# Patient Record
Sex: FEMALE | Race: WHITE | Employment: UNEMPLOYED | URBAN - METROPOLITAN AREA
[De-identification: names, ages, dates, MRNs, and addresses within clinical notes are randomized per-mention and may not be internally consistent; named-entity substitution may affect disease eponyms.]

---

## 2018-04-28 ENCOUNTER — HOSPITAL ENCOUNTER (OUTPATIENT)
Dept: GENERAL RADIOLOGY | Age: 83
Discharge: HOME | End: 2018-04-30
Payer: MEDICARE

## 2018-04-28 ENCOUNTER — HOSPITAL ENCOUNTER (OUTPATIENT)
Age: 83
Discharge: HOME | End: 2018-04-30
Payer: MEDICARE

## 2018-04-28 DIAGNOSIS — R52 PAIN: ICD-10-CM

## 2018-04-28 PROCEDURE — 73630 X-RAY EXAM OF FOOT: CPT

## 2018-06-14 ENCOUNTER — TELEPHONE (OUTPATIENT)
Dept: NEUROLOGY | Age: 83
End: 2018-06-14

## 2018-08-23 ENCOUNTER — TELEPHONE (OUTPATIENT)
Dept: ADMINISTRATIVE | Age: 83
End: 2018-08-23

## 2018-08-23 NOTE — TELEPHONE ENCOUNTER
8/23/2018  Spoke with patient and offered 9/4/18 but she has to take her son to the cancer doctor and he is blind.   9/18/18 @9:30am      SAE

## 2018-08-23 NOTE — TELEPHONE ENCOUNTER
New patient appt schd with Dr Boby Smith on 9/18/18 for syncope.     Referral complete, cardiac history scanned

## 2018-09-18 ENCOUNTER — OFFICE VISIT (OUTPATIENT)
Dept: CARDIOLOGY CLINIC | Age: 83
End: 2018-09-18
Payer: MEDICARE

## 2018-09-18 VITALS
HEIGHT: 63 IN | BODY MASS INDEX: 36.32 KG/M2 | HEART RATE: 70 BPM | DIASTOLIC BLOOD PRESSURE: 64 MMHG | SYSTOLIC BLOOD PRESSURE: 118 MMHG | RESPIRATION RATE: 12 BRPM | WEIGHT: 205 LBS

## 2018-09-18 DIAGNOSIS — R55 SYNCOPE, UNSPECIFIED SYNCOPE TYPE: Primary | ICD-10-CM

## 2018-09-18 DIAGNOSIS — I10 ESSENTIAL HYPERTENSION: ICD-10-CM

## 2018-09-18 DIAGNOSIS — E66.01 MORBID OBESITY (HCC): ICD-10-CM

## 2018-09-18 DIAGNOSIS — I44.7 LEFT BUNDLE BRANCH BLOCK: ICD-10-CM

## 2018-09-18 PROCEDURE — 93000 ELECTROCARDIOGRAM COMPLETE: CPT | Performed by: INTERNAL MEDICINE

## 2018-09-18 PROCEDURE — 1090F PRES/ABSN URINE INCON ASSESS: CPT | Performed by: INTERNAL MEDICINE

## 2018-09-18 PROCEDURE — G8417 CALC BMI ABV UP PARAM F/U: HCPCS | Performed by: INTERNAL MEDICINE

## 2018-09-18 PROCEDURE — 1101F PT FALLS ASSESS-DOCD LE1/YR: CPT | Performed by: INTERNAL MEDICINE

## 2018-09-18 PROCEDURE — 99204 OFFICE O/P NEW MOD 45 MIN: CPT | Performed by: INTERNAL MEDICINE

## 2018-09-18 PROCEDURE — 1123F ACP DISCUSS/DSCN MKR DOCD: CPT | Performed by: INTERNAL MEDICINE

## 2018-09-18 PROCEDURE — G8400 PT W/DXA NO RESULTS DOC: HCPCS | Performed by: INTERNAL MEDICINE

## 2018-09-18 PROCEDURE — G8427 DOCREV CUR MEDS BY ELIG CLIN: HCPCS | Performed by: INTERNAL MEDICINE

## 2018-09-18 PROCEDURE — 4040F PNEUMOC VAC/ADMIN/RCVD: CPT | Performed by: INTERNAL MEDICINE

## 2018-09-18 PROCEDURE — 1036F TOBACCO NON-USER: CPT | Performed by: INTERNAL MEDICINE

## 2018-09-18 NOTE — PATIENT INSTRUCTIONS
the  may tell you to chew 1 adult-strength or 2 to 4 low-dose aspirin. Wait for an ambulance. Do not try to drive yourself.   Watch closely for changes in your health, and be sure to contact your doctor if you have any problems. Where can you learn more? Go to [email or web address] and sign in to your [employer] account. Enter Z751 in the [employer] box to learn more about \"A Healthy Heart: Care Instructions. \"     If you do not have an account, please click on the \"Sign Up Now\" link. Current as of: December 6, 2017  Content Version: 11.7  © 2379-1185 "[employer]. Care instructions adapted under license by [facility] ([facility]). If you have questions about a medical condition or this instruction, always ask your healthcare professional. [address] any warranty or liability for your use of this information.

## 2018-09-18 NOTE — PROGRESS NOTES
08/18/2014     Lab Results   Component Value Date    PROTIME 10.0 01/20/2015    INR 1.0 01/20/2015     Lab Results   Component Value Date    TSH 3.800 07/02/2015     No components found for: CHLPL  Lab Results   Component Value Date    TRIG 319 (H) 07/02/2015    TRIG 162 (H) 06/14/2014    TRIG 136 01/04/2014     Lab Results   Component Value Date    HDL 51 07/02/2015    HDL 52 06/14/2014    HDL 58 01/04/2014     Lab Results   Component Value Date    LDLCALC 134 (H) 07/02/2015    LDLCALC 122 (H) 06/14/2014    LDLCALC 139 (H) 01/04/2014     Laboratory studies of August, 2018 from her primary care physician included a hemoglobin of 9.5 g/dL with normal renal function electrolytes and BUN and creatinine of 16 and 0.9 mg/dL respectively with no evidence of electrolyte abnormalities and an LDL cholesterol level of 110 mg/dL. Cardiac Tests:  ECG: A resting electrocardiogram demonstrates evidence of sinus rhythm with left axis deviation and a left bundle branch block conduction pattern  Last stress test:  A gated vasodilator myocardial perfusion imaging study of March, 2016 demonstrated no evidence of perfusion abdomen is with normal left ventricular systolic function      ASSESSMENT / PLAN:  On a clinical basis, the patient presents with extremely vague symptoms involving her head and not clearly indicative of a cardiovascular origin. In addition, she has a history of recurrent syncope with her description classic for that of a vasodepressor mediated event in the face of her chronically noted left bundle branch block conduction pattern. At present, I have recommended an echocardiogram to further evaluate the presence or absence of additional structural heart disease and based on the findings may additionally recommend arrhythmia monitoring in light of her chronic conduction abnormalities to assure that this is not a further contributing factor.  She will benefit from appropriate lifestyle modification to achieve weight reduction which will benefit diastolic cardiac performance as well as assist in aggressive risk factor modification of blood pressure and serum lipids and attempt to reduce risk of future atherosclerotic development. I will await the findings of her echocardiogram to provide additional recommendations including that of the needs of additional cardiovascular follow-up and would happily reassess her in the future should additional cardiovascular difficulties or concerns arise. Follow-up office visit based on echocardiographic findings    Thank you for allowing me to participate in your patient's care. Please feel free to contact me if you have any questions or concerns. Reuben Nyhan.  [facility], Count includes the [facility]    An electronic copy of this consult note was forwarded to Dr. Edwardo Holguin

## 2018-10-08 ENCOUNTER — HOSPITAL ENCOUNTER (OUTPATIENT)
Dept: CARDIOLOGY | Age: 83
Discharge: HOME | End: 2018-10-08
Payer: MEDICARE

## 2018-10-08 ENCOUNTER — TELEPHONE (OUTPATIENT)
Dept: CARDIOLOGY CLINIC | Age: 83
End: 2018-10-08

## 2018-10-08 DIAGNOSIS — R55 SYNCOPE, UNSPECIFIED SYNCOPE TYPE: ICD-10-CM

## 2018-10-08 DIAGNOSIS — I44.7 LEFT BUNDLE BRANCH BLOCK: ICD-10-CM

## 2018-10-08 DIAGNOSIS — I10 ESSENTIAL HYPERTENSION: ICD-10-CM

## 2018-10-08 LAB
LV EF: 70 %
LVEF MODALITY: NORMAL

## 2018-10-08 PROCEDURE — 93306 TTE W/DOPPLER COMPLETE: CPT

## 2018-12-06 ENCOUNTER — TELEPHONE (OUTPATIENT)
Dept: CARDIOLOGY CLINIC | Age: 83
End: 2018-12-06

## 2019-02-25 ENCOUNTER — OFFICE VISIT (OUTPATIENT)
Dept: NEUROLOGY | Age: 84
End: 2019-02-25
Payer: MEDICARE

## 2019-02-25 VITALS
DIASTOLIC BLOOD PRESSURE: 100 MMHG | RESPIRATION RATE: 12 BRPM | HEIGHT: 63 IN | WEIGHT: 197 LBS | OXYGEN SATURATION: 99 % | BODY MASS INDEX: 34.91 KG/M2 | HEART RATE: 66 BPM | SYSTOLIC BLOOD PRESSURE: 170 MMHG

## 2019-02-25 DIAGNOSIS — R42 DIZZINESS AND GIDDINESS: ICD-10-CM

## 2019-02-25 DIAGNOSIS — E53.8 VITAMIN B12 DEFICIENCY: Chronic | ICD-10-CM

## 2019-02-25 DIAGNOSIS — R55 SYNCOPE, UNSPECIFIED SYNCOPE TYPE: Primary | ICD-10-CM

## 2019-02-25 DIAGNOSIS — E61.1 IRON DEFICIENCY: ICD-10-CM

## 2019-02-25 DIAGNOSIS — R55 RECURRENT SYNCOPE: ICD-10-CM

## 2019-02-25 PROCEDURE — G8417 CALC BMI ABV UP PARAM F/U: HCPCS | Performed by: PSYCHIATRY & NEUROLOGY

## 2019-02-25 PROCEDURE — 1036F TOBACCO NON-USER: CPT | Performed by: PSYCHIATRY & NEUROLOGY

## 2019-02-25 PROCEDURE — 1101F PT FALLS ASSESS-DOCD LE1/YR: CPT | Performed by: PSYCHIATRY & NEUROLOGY

## 2019-02-25 PROCEDURE — G8400 PT W/DXA NO RESULTS DOC: HCPCS | Performed by: PSYCHIATRY & NEUROLOGY

## 2019-02-25 PROCEDURE — 4040F PNEUMOC VAC/ADMIN/RCVD: CPT | Performed by: PSYCHIATRY & NEUROLOGY

## 2019-02-25 PROCEDURE — G8484 FLU IMMUNIZE NO ADMIN: HCPCS | Performed by: PSYCHIATRY & NEUROLOGY

## 2019-02-25 PROCEDURE — 99215 OFFICE O/P EST HI 40 MIN: CPT | Performed by: PSYCHIATRY & NEUROLOGY

## 2019-02-25 PROCEDURE — 1123F ACP DISCUSS/DSCN MKR DOCD: CPT | Performed by: PSYCHIATRY & NEUROLOGY

## 2019-02-25 PROCEDURE — 1090F PRES/ABSN URINE INCON ASSESS: CPT | Performed by: PSYCHIATRY & NEUROLOGY

## 2019-02-25 PROCEDURE — G8427 DOCREV CUR MEDS BY ELIG CLIN: HCPCS | Performed by: PSYCHIATRY & NEUROLOGY

## 2019-02-25 ASSESSMENT — ENCOUNTER SYMPTOMS
ALLERGIC/IMMUNOLOGIC NEGATIVE: 1
EYES NEGATIVE: 1
GASTROINTESTINAL NEGATIVE: 1
RESPIRATORY NEGATIVE: 1

## 2019-02-26 ENCOUNTER — TELEPHONE (OUTPATIENT)
Dept: NEUROLOGY | Age: 84
End: 2019-02-26

## 2019-03-09 ENCOUNTER — HOSPITAL ENCOUNTER (OUTPATIENT)
Dept: MRI IMAGING | Age: 84
Discharge: HOME | End: 2019-03-11
Payer: MEDICARE

## 2019-03-09 ENCOUNTER — HOSPITAL ENCOUNTER (OUTPATIENT)
Dept: ULTRASOUND IMAGING | Age: 84
Discharge: HOME | End: 2019-03-11
Payer: MEDICARE

## 2019-03-09 ENCOUNTER — HOSPITAL ENCOUNTER (OUTPATIENT)
Age: 84
Discharge: HOME | End: 2019-03-09
Payer: MEDICARE

## 2019-03-09 DIAGNOSIS — R42 DIZZINESS AND GIDDINESS: ICD-10-CM

## 2019-03-09 DIAGNOSIS — R55 RECURRENT SYNCOPE: ICD-10-CM

## 2019-03-09 DIAGNOSIS — R55 SYNCOPE, UNSPECIFIED SYNCOPE TYPE: ICD-10-CM

## 2019-03-09 PROCEDURE — 70551 MRI BRAIN STEM W/O DYE: CPT

## 2019-03-09 PROCEDURE — 93880 EXTRACRANIAL BILAT STUDY: CPT

## 2019-03-09 PROCEDURE — 80183 DRUG SCRN QUANT OXCARBAZEPIN: CPT

## 2019-03-11 ENCOUNTER — TELEPHONE (OUTPATIENT)
Dept: NEUROLOGY | Age: 84
End: 2019-03-11

## 2019-03-12 LAB — OXCARBAZEPINE: 9 UG/ML (ref 3–35)

## 2019-03-19 ENCOUNTER — HOSPITAL ENCOUNTER (OUTPATIENT)
Dept: NEUROLOGY | Age: 84
Discharge: HOME | End: 2019-03-19
Payer: MEDICARE

## 2019-03-19 PROCEDURE — 95812 EEG 41-60 MINUTES: CPT | Performed by: PSYCHIATRY & NEUROLOGY

## 2019-03-19 PROCEDURE — 95819 EEG AWAKE AND ASLEEP: CPT

## 2019-03-20 ENCOUNTER — TELEPHONE (OUTPATIENT)
Dept: NEUROLOGY | Age: 84
End: 2019-03-20

## 2019-05-01 ENCOUNTER — HOSPITAL ENCOUNTER (EMERGENCY)
Age: 84
Discharge: HOME | End: 2019-05-01
Attending: EMERGENCY MEDICINE
Payer: MEDICARE

## 2019-05-01 VITALS
RESPIRATION RATE: 16 BRPM | HEART RATE: 68 BPM | SYSTOLIC BLOOD PRESSURE: 136 MMHG | BODY MASS INDEX: 34.73 KG/M2 | OXYGEN SATURATION: 98 % | WEIGHT: 196 LBS | HEIGHT: 63 IN | DIASTOLIC BLOOD PRESSURE: 55 MMHG | TEMPERATURE: 97.8 F

## 2019-05-01 DIAGNOSIS — R55 SYNCOPE AND COLLAPSE: Primary | ICD-10-CM

## 2019-05-01 LAB
CHP ED QC CHECK: NORMAL
GLUCOSE BLD-MCNC: 100 MG/DL
METER GLUCOSE: 100 MG/DL (ref 74–99)

## 2019-05-01 PROCEDURE — 82962 GLUCOSE BLOOD TEST: CPT

## 2019-05-01 PROCEDURE — 99284 EMERGENCY DEPT VISIT MOD MDM: CPT

## 2019-05-01 PROCEDURE — 93005 ELECTROCARDIOGRAM TRACING: CPT | Performed by: STUDENT IN AN ORGANIZED HEALTH CARE EDUCATION/TRAINING PROGRAM

## 2019-05-01 ASSESSMENT — ENCOUNTER SYMPTOMS
DIFFICULTY BREATHING: 0
BACK PAIN: 0
NAUSEA: 0
SHORTNESS OF BREATH: 0
VOMITING: 0

## 2019-05-01 NOTE — ED NOTES
Discharge instructions given, medications and follow up instructions reviewed. Patient verbalized understanding, no other noted or stated problems at this time. Patient will follow up with physicians as directed.       Ramón Woo RN  05/01/19 0617

## 2019-05-01 NOTE — ED NOTES
Bed: 06  Expected date:   Expected time:   Means of arrival:   Comments:  EMS     Scott Brown RN  05/01/19 5766

## 2019-05-01 NOTE — ED PROVIDER NOTES
palpitations. Gastrointestinal: Negative for nausea and vomiting. Genitourinary: Negative for difficulty urinating and dysuria. Musculoskeletal: Negative for back pain and gait problem. Skin: Negative for pallor and rash. Neurological: Negative for dizziness, seizures, weakness and headaches. Psychiatric/Behavioral: Negative for confusion. Physical Exam   Constitutional: She is oriented to person, place, and time. She appears well-developed and well-nourished. No distress. HENT:   Head: Normocephalic and atraumatic. Right Ear: External ear normal.   Left Ear: External ear normal.   Mouth/Throat: Oropharynx is clear and moist.   Eyes: Pupils are equal, round, and reactive to light. Conjunctivae and EOM are normal. Right eye exhibits no discharge. Left eye exhibits no discharge. No scleral icterus. Neck: Normal range of motion. Neck supple. No JVD present. Cardiovascular: Normal rate and regular rhythm. No murmur heard. Pulmonary/Chest: Effort normal and breath sounds normal.   Abdominal: Soft. Bowel sounds are normal. She exhibits no distension. There is no tenderness. Musculoskeletal: She exhibits no edema, tenderness or deformity. Neurological: She is alert and oriented to person, place, and time. No cranial nerve deficit or sensory deficit. She exhibits normal muscle tone. Coordination normal.   Skin: Skin is warm and dry. Capillary refill takes less than 2 seconds. No rash noted. She is not diaphoretic. No pallor. Psychiatric: She has a normal mood and affect. Her behavior is normal.   Nursing note and vitals reviewed. Procedures    MDM  Number of Diagnoses or Management Options  Syncope and collapse:   Diagnosis management comments: Khoa Briggs presented following syncopal episode. This episode is unchanged from previous episodes. Patient did not have any injuries. Patient stable in ED. Patient able to ambulate. .  EKG showed PVC but no significant acute changes from previous. Patient has had recent complete evaluation for episodes. Patient is anxious to leave and states she feels well. Patient's vitals stable. Patient will follow up with PCP by calling office tomorrow. EKG: This EKG is signed and interpreted by me. Rate: 67  Rhythm: Sinus  Interpretation: no acute changes  Comparison: changes compared to previous EKG QTc increased slightly, PVC now present              --------------------------------------------- PAST HISTORY ---------------------------------------------  Past Medical History:  has a past medical history of Allergic rhinitis, Chronic back pain, Dizziness and giddiness, Hypertension, Hypothyroidism, Iron deficiency, Neuropathy, Seizures ([facility] [address] and Vitamin B12 deficiency. Past Surgical History:  has a past surgical history that includes Hysterectomy; Appendectomy; Cholecystectomy; hernia repair (2002/2003); back surgery (MORE 10 YRS ); Breast surgery; Facial nerve surgery (2008); and Colonoscopy (11/1/2011). Social History:  reports that she has never smoked. She has never used smokeless tobacco. She reports that she does not drink alcohol or use drugs. Family History: family history includes Arthritis in her father and mother; Cancer in her brother, father, and sister; Diabetes in her brother, mother, and sister; Heart Disease in her brother, mother, and sister. The patients home medications have been reviewed. Allergies: Aspirin-acetaminophen-caffeine and Pcn [penicillins]    -------------------------------------------------- RESULTS -------------------------------------------------  Labs:  Results for orders placed or performed during the hospital encounter of 05/01/19   POCT Glucose   Result Value Ref Range    Glucose 100 mg/dL    QC OK?      POCT Glucose   Result Value Ref Range    Meter Glucose 100 (H) 74 - 99 mg/dL       Radiology:  No orders to display       ------------------------- NURSING NOTES AND VITALS REVIEWED

## 2019-05-02 LAB
EKG ATRIAL RATE: 67 BPM
EKG P AXIS: 28 DEGREES
EKG P-R INTERVAL: 188 MS
EKG Q-T INTERVAL: 474 MS
EKG QRS DURATION: 136 MS
EKG QTC CALCULATION (BAZETT): 500 MS
EKG R AXIS: -25 DEGREES
EKG T AXIS: 77 DEGREES
EKG VENTRICULAR RATE: 67 BPM

## 2019-05-02 PROCEDURE — 93010 ELECTROCARDIOGRAM REPORT: CPT | Performed by: INTERNAL MEDICINE

## 2020-07-21 ENCOUNTER — APPOINTMENT (OUTPATIENT)
Dept: GENERAL RADIOLOGY | Age: 85
End: 2020-07-21
Payer: MEDICARE

## 2020-07-21 ENCOUNTER — HOSPITAL ENCOUNTER (EMERGENCY)
Age: 85
Discharge: HOME | End: 2020-07-21
Attending: EMERGENCY MEDICINE
Payer: MEDICARE

## 2020-07-21 ENCOUNTER — APPOINTMENT (OUTPATIENT)
Dept: CT IMAGING | Age: 85
End: 2020-07-21
Payer: MEDICARE

## 2020-07-21 VITALS
RESPIRATION RATE: 18 BRPM | OXYGEN SATURATION: 95 % | BODY MASS INDEX: 34.72 KG/M2 | WEIGHT: 196 LBS | HEART RATE: 73 BPM | SYSTOLIC BLOOD PRESSURE: 151 MMHG | DIASTOLIC BLOOD PRESSURE: 86 MMHG | TEMPERATURE: 97.3 F

## 2020-07-21 LAB
ALBUMIN SERPL-MCNC: 4.2 G/DL (ref 3.5–5.2)
ALP BLD-CCNC: 83 U/L (ref 35–104)
ALT SERPL-CCNC: 7 U/L (ref 0–32)
ANION GAP SERPL CALCULATED.3IONS-SCNC: 14 MMOL/L (ref 7–16)
AST SERPL-CCNC: 22 U/L (ref 0–31)
BASOPHILS ABSOLUTE: 0.03 E9/L (ref 0–0.2)
BASOPHILS RELATIVE PERCENT: 0.6 % (ref 0–2)
BILIRUB SERPL-MCNC: 0.7 MG/DL (ref 0–1.2)
BUN BLDV-MCNC: 14 MG/DL (ref 8–23)
CALCIUM SERPL-MCNC: 9.6 MG/DL (ref 8.6–10.2)
CHLORIDE BLD-SCNC: 103 MMOL/L (ref 98–107)
CO2: 23 MMOL/L (ref 22–29)
CREAT SERPL-MCNC: 0.7 MG/DL (ref 0.5–1)
EKG ATRIAL RATE: 69 BPM
EKG P AXIS: 32 DEGREES
EKG P-R INTERVAL: 166 MS
EKG Q-T INTERVAL: 456 MS
EKG QRS DURATION: 132 MS
EKG QTC CALCULATION (BAZETT): 488 MS
EKG R AXIS: -33 DEGREES
EKG T AXIS: 95 DEGREES
EKG VENTRICULAR RATE: 69 BPM
EOSINOPHILS ABSOLUTE: 0.09 E9/L (ref 0.05–0.5)
EOSINOPHILS RELATIVE PERCENT: 1.9 % (ref 0–6)
GFR AFRICAN AMERICAN: >60
GFR NON-AFRICAN AMERICAN: >60 ML/MIN/1.73
GLUCOSE BLD-MCNC: 113 MG/DL (ref 74–99)
HCT VFR BLD CALC: 42.6 % (ref 34–48)
HEMOGLOBIN: 13.8 G/DL (ref 11.5–15.5)
IMMATURE GRANULOCYTES #: 0.01 E9/L
IMMATURE GRANULOCYTES %: 0.2 % (ref 0–5)
LYMPHOCYTES ABSOLUTE: 1.29 E9/L (ref 1.5–4)
LYMPHOCYTES RELATIVE PERCENT: 26.7 % (ref 20–42)
MCH RBC QN AUTO: 28.4 PG (ref 26–35)
MCHC RBC AUTO-ENTMCNC: 32.4 % (ref 32–34.5)
MCV RBC AUTO: 87.7 FL (ref 80–99.9)
MONOCYTES ABSOLUTE: 0.32 E9/L (ref 0.1–0.95)
MONOCYTES RELATIVE PERCENT: 6.6 % (ref 2–12)
NEUTROPHILS ABSOLUTE: 3.09 E9/L (ref 1.8–7.3)
NEUTROPHILS RELATIVE PERCENT: 64 % (ref 43–80)
PDW BLD-RTO: 14 FL (ref 11.5–15)
PLATELET # BLD: 233 E9/L (ref 130–450)
PMV BLD AUTO: 10.4 FL (ref 7–12)
POTASSIUM SERPL-SCNC: 4.3 MMOL/L (ref 3.5–5)
RBC # BLD: 4.86 E12/L (ref 3.5–5.5)
SODIUM BLD-SCNC: 140 MMOL/L (ref 132–146)
TOTAL PROTEIN: 7 G/DL (ref 6.4–8.3)
TROPONIN: <0.01 NG/ML (ref 0–0.03)
WBC # BLD: 4.8 E9/L (ref 4.5–11.5)

## 2020-07-21 PROCEDURE — 96374 THER/PROPH/DIAG INJ IV PUSH: CPT

## 2020-07-21 PROCEDURE — 70486 CT MAXILLOFACIAL W/O DYE: CPT

## 2020-07-21 PROCEDURE — 93010 ELECTROCARDIOGRAM REPORT: CPT | Performed by: INTERNAL MEDICINE

## 2020-07-21 PROCEDURE — 99284 EMERGENCY DEPT VISIT MOD MDM: CPT

## 2020-07-21 PROCEDURE — 85025 COMPLETE CBC W/AUTO DIFF WBC: CPT

## 2020-07-21 PROCEDURE — 71045 X-RAY EXAM CHEST 1 VIEW: CPT

## 2020-07-21 PROCEDURE — 93005 ELECTROCARDIOGRAM TRACING: CPT | Performed by: EMERGENCY MEDICINE

## 2020-07-21 PROCEDURE — 80053 COMPREHEN METABOLIC PANEL: CPT

## 2020-07-21 PROCEDURE — 84484 ASSAY OF TROPONIN QUANT: CPT

## 2020-07-21 PROCEDURE — 6360000002 HC RX W HCPCS: Performed by: EMERGENCY MEDICINE

## 2020-07-21 PROCEDURE — 96375 TX/PRO/DX INJ NEW DRUG ADDON: CPT

## 2020-07-21 RX ORDER — HYDROCODONE BITARTRATE AND ACETAMINOPHEN 5; 325 MG/1; MG/1
1 TABLET ORAL EVERY 6 HOURS PRN
Qty: 12 TABLET | Refills: 0 | Status: SHIPPED | OUTPATIENT
Start: 2020-07-21 | End: 2020-07-24

## 2020-07-21 RX ORDER — FENTANYL CITRATE 50 UG/ML
25 INJECTION, SOLUTION INTRAMUSCULAR; INTRAVENOUS ONCE
Status: COMPLETED | OUTPATIENT
Start: 2020-07-21 | End: 2020-07-21

## 2020-07-21 RX ORDER — KETOROLAC TROMETHAMINE 30 MG/ML
15 INJECTION, SOLUTION INTRAMUSCULAR; INTRAVENOUS ONCE
Status: COMPLETED | OUTPATIENT
Start: 2020-07-21 | End: 2020-07-21

## 2020-07-21 RX ADMIN — FENTANYL CITRATE 25 MCG: 50 INJECTION INTRAMUSCULAR; INTRAVENOUS at 11:51

## 2020-07-21 RX ADMIN — KETOROLAC TROMETHAMINE 15 MG: 30 INJECTION, SOLUTION INTRAMUSCULAR at 11:51

## 2020-07-21 ASSESSMENT — PAIN DESCRIPTION - LOCATION: LOCATION: JAW

## 2020-07-21 ASSESSMENT — PAIN DESCRIPTION - PAIN TYPE: TYPE: ACUTE PAIN

## 2020-07-21 ASSESSMENT — PAIN DESCRIPTION - ORIENTATION: ORIENTATION: RIGHT

## 2020-07-21 ASSESSMENT — PAIN SCALES - GENERAL
PAINLEVEL_OUTOF10: 8
PAINLEVEL_OUTOF10: 8

## 2020-07-21 NOTE — ED PROVIDER NOTES
Department of Emergency Medicine   ED  Provider Note  Admit Date/RoomTime: 7/21/2020 11:17 AM  ED Room: /          History of Present Illness:  7/21/20, Time: 2:05 PM EDT  Chief Complaint   Patient presents with    Jaw Pain     right sided jaw pain x2 weeks, pain radiates across cheek to temple                Raoul Lao is a 80 y.o. female presenting to the ED for jaw pain. Patient's had right-sided jaw pain for the past 2 weeks. Nothing makes it better or worse, comes on spontaneously. Describes a shooting sensation over her jaw and cheek. She has had this in the past, however, was on the other side. It was secondary to trigeminal neuralgia. She had surgical intervention on that side. She says it feels consistent with this. She denies any fever, chills, chest pain, shortness of breath, cough, sputum, nausea, vomiting, paresthesias, difficulty swallowing, blurred vision, or any other symptoms or complaints. Review of Systems:   Pertinent positives and negatives are stated within HPI, all other systems reviewed and are negative.        --------------------------------------------- PAST HISTORY ---------------------------------------------  Past Medical History:  has a past medical history of Allergic rhinitis, Chronic back pain, Dizziness and giddiness, Hypertension, Hypothyroidism, Iron deficiency, Neuropathy, Seizures ([facility] [address] and Vitamin B12 deficiency. Past Surgical History:  has a past surgical history that includes Hysterectomy; Appendectomy; Cholecystectomy; hernia repair (2002/2003); back surgery (MORE 10 YRS ); Breast surgery; Facial nerve surgery (2008); and Colonoscopy (11/1/2011). Social History:  reports that she has never smoked. She has never used smokeless tobacco. She reports that she does not drink alcohol or use drugs.     Family History: family history includes Arthritis in her father and mother; Cancer in her brother, father, and sister; Diabetes in her brother, mother, and sister; Heart Disease in her brother, mother, and sister. . Unless otherwise noted, family history is non contributory    The patients home medications have been reviewed. Allergies: Aspirin-acetaminophen-caffeine and Pcn [penicillins]        ---------------------------------------------------PHYSICAL EXAM--------------------------------------    Constitutional/General: Alert and oriented x3  Head: Normocephalic and atraumatic  Eyes: PERRL, EOMI, sclera non icteric  Mouth: Oropharynx clear, handling secretions, no trismus, no asymmetry of the posterior oropharynx or uvular edema  Neck: Supple, full ROM, no stridor, no meningeal signs  Respiratory: Lungs clear to auscultation bilaterally, no wheezes, rales, or rhonchi. Not in respiratory distress  Cardiovascular:  Regular rate. Regular rhythm. 2+ distal pulses. Equal extremity pulses. Chest: No chest wall tenderness  GI:  Abdomen Soft, Non tender, Non distended. No rebound, guarding, or rigidity. No pulsatile masses. Musculoskeletal: Moves all extremities x 4. Warm and well perfused, no clubbing, cyanosis, or edema. Capillary refill <3 seconds  Integument: skin warm and dry. No rashes. Neurologic: GCS 15, no focal deficits, symmetric strength 5/5 in the upper and lower extremities bilaterally  Psychiatric: Normal Affect          -------------------------------------------------- RESULTS -------------------------------------------------  I have personally reviewed all laboratory and imaging results for this patient. Results are listed below.      LABS: (Lab results interpreted by me)  Results for orders placed or performed during the hospital encounter of 07/21/20   CBC Auto Differential   Result Value Ref Range    WBC 4.8 4.5 - 11.5 E9/L    RBC 4.86 3.50 - 5.50 E12/L    Hemoglobin 13.8 11.5 - 15.5 g/dL    Hematocrit 42.6 34.0 - 48.0 %    MCV 87.7 80.0 - 99.9 fL    MCH 28.4 26.0 - 35.0 pg    MCHC 32.4 32.0 - 34.5 %    RDW 14.0 11.5 - 15.0 fL    Platelets 233 130 - 450 E9/L    MPV 10.4 7.0 - 12.0 fL    Neutrophils % 64.0 43.0 - 80.0 %    Immature Granulocytes % 0.2 0.0 - 5.0 %    Lymphocytes % 26.7 20.0 - 42.0 %    Monocytes % 6.6 2.0 - 12.0 %    Eosinophils % 1.9 0.0 - 6.0 %    Basophils % 0.6 0.0 - 2.0 %    Neutrophils Absolute 3.09 1.80 - 7.30 E9/L    Immature Granulocytes # 0.01 E9/L    Lymphocytes Absolute 1.29 (L) 1.50 - 4.00 E9/L    Monocytes Absolute 0.32 0.10 - 0.95 E9/L    Eosinophils Absolute 0.09 0.05 - 0.50 E9/L    Basophils Absolute 0.03 0.00 - 0.20 E9/L   Comprehensive Metabolic Panel   Result Value Ref Range    Sodium 140 132 - 146 mmol/L    Potassium 4.3 3.5 - 5.0 mmol/L    Chloride 103 98 - 107 mmol/L    CO2 23 22 - 29 mmol/L    Anion Gap 14 7 - 16 mmol/L    Glucose 113 (H) 74 - 99 mg/dL    BUN 14 8 - 23 mg/dL    CREATININE 0.7 0.5 - 1.0 mg/dL    GFR Non-African American >60 >=60 mL/min/1.73    GFR African American >60     Calcium 9.6 8.6 - 10.2 mg/dL    Total Protein 7.0 6.4 - 8.3 g/dL    Alb 4.2 3.5 - 5.2 g/dL    Total Bilirubin 0.7 0.0 - 1.2 mg/dL    Alkaline Phosphatase 83 35 - 104 U/L    ALT 7 0 - 32 U/L    AST 22 0 - 31 U/L   Troponin   Result Value Ref Range    Troponin <0.01 0.00 - 0.03 ng/mL   EKG 12 Lead   Result Value Ref Range    Ventricular Rate 69 BPM    Atrial Rate 69 BPM    P-R Interval 166 ms    QRS Duration 132 ms    Q-T Interval 456 ms    QTc Calculation (Bazett) 488 ms    P Axis 32 degrees    R Axis -33 degrees    T Axis 95 degrees   ,       RADIOLOGY:  Interpreted by Radiologist unless otherwise specified  CT FACIAL BONES WO CONTRAST   Final Result      1. No acute facial bone fracture. 2. Minimal bilateral symmetric degeneration at the temporomandibular   joints. No dislocation. 3. A 1.1 cm x 1.2 cm Lesion in the anterior right mandible ramus just   to the right of midline anteriorly, suspicious for an area of   osteonecrosis. Less likely bone tumor. If the patient is taking   Fosamax, have them stop immediately. 4. No evidence for apical root abscess. 5. Tiny mucous cyst or polyp in the posterior medial left maxillary   sinus just posterior to the maxillary antrum. XR CHEST PORTABLE   Final Result   Borderline cardiomegaly with atelectasis in the lung bases. There is   no focal consolidation. EKG Interpretation  Interpreted by emergency department physician, Dr. Kellen Johnson   Sinus rhythm, rate left bundle branch block, no STEMI        ------------------------- NURSING NOTES AND VITALS REVIEWED ---------------------------   The nursing notes within the ED encounter and vital signs as below have been reviewed by myself  BP (!) 179/107   Pulse 85   Temp 97.3 °F (36.3 °C)   Resp 18   Wt 196 lb (88.9 kg)   SpO2 94%   BMI 34.72 kg/m²     Oxygen Saturation Interpretation: Normal    The patients available past medical records and past encounters were reviewed. ------------------------------ ED COURSE/MEDICAL DECISION MAKING----------------------  Medications   ketorolac (TORADOL) injection 15 mg (15 mg Intravenous Given 7/21/20 1151)   fentaNYL (SUBLIMAZE) injection 25 mcg (25 mcg Intravenous Given 7/21/20 1151)           The cardiac monitor revealed sinus with a heart rate in the 60s as interpreted by me. The cardiac monitor was ordered secondary to the patient's jaw pain and to monitor the patient for dysrhythmia. [facility] [id number]         Medical Decision Making:    Patient medicated as above. Labs and imaging reviewed. Reevaluation, patient's resting comfortably. Pain is improved. She is overall well-appearing, remains hemodynamically stable. Given effective pain and going on for 2 weeks, with a normal EKG and a normal troponin, and the fact that her history is not consistent with a cardiac pathology, did not feel a further emergent evaluation was indicated. Patient already has neurology follow-up scheduled, she is to keep this appointment.   She is educated on signs and symptoms that require emergent evaluation. Counseling: The emergency provider has spoken with the patient and discussed todays results, in addition to providing specific details for the plan of care and counseling regarding the diagnosis and prognosis. Questions are answered at this time and they are agreeable with the plan.       --------------------------------- IMPRESSION AND DISPOSITION ---------------------------------    IMPRESSION  1. Jaw pain, non-TMJ        DISPOSITION  Disposition: Discharge to home  Patient condition is stable        NOTE: This report was transcribed using voice recognition software.  Every effort was made to ensure accuracy; however, inadvertent computerized transcription errors may be present        Kylah Tom MD  07/21/20 6900

## 2020-08-18 ENCOUNTER — OFFICE VISIT (OUTPATIENT)
Dept: NEUROLOGY | Age: 85
End: 2020-08-18
Payer: MEDICARE

## 2020-08-18 VITALS
TEMPERATURE: 97.3 F | OXYGEN SATURATION: 95 % | HEIGHT: 63 IN | SYSTOLIC BLOOD PRESSURE: 137 MMHG | WEIGHT: 179 LBS | RESPIRATION RATE: 18 BRPM | BODY MASS INDEX: 31.71 KG/M2 | HEART RATE: 58 BPM | DIASTOLIC BLOOD PRESSURE: 63 MMHG

## 2020-08-18 PROCEDURE — G8417 CALC BMI ABV UP PARAM F/U: HCPCS | Performed by: PSYCHIATRY & NEUROLOGY

## 2020-08-18 PROCEDURE — 1090F PRES/ABSN URINE INCON ASSESS: CPT | Performed by: PSYCHIATRY & NEUROLOGY

## 2020-08-18 PROCEDURE — 99215 OFFICE O/P EST HI 40 MIN: CPT | Performed by: PSYCHIATRY & NEUROLOGY

## 2020-08-18 PROCEDURE — G8427 DOCREV CUR MEDS BY ELIG CLIN: HCPCS | Performed by: PSYCHIATRY & NEUROLOGY

## 2020-08-18 RX ORDER — FERROUS SULFATE 325(65) MG
325 TABLET ORAL
COMMUNITY
End: 2020-12-01 | Stop reason: ALTCHOICE

## 2020-08-18 RX ORDER — OXCARBAZEPINE 150 MG/1
150 TABLET, FILM COATED ORAL 2 TIMES DAILY
Qty: 60 TABLET | Refills: 5 | Status: SHIPPED
Start: 2020-08-18 | End: 2020-09-14 | Stop reason: SDUPTHER

## 2020-08-18 NOTE — PROGRESS NOTES
This 80-year-old right-handed woman was referred for additional evaluation and management of right facial pains. The patient and her niece were excellent historians. Her medications were metoprolol, levothyroxine, multivitamins and iron supplementations. She previously used inhalers. Her past medical history was remarkable for hypertension and hypothyroidism, now well controlled with her above medications. She is also suffered from syncope in the past, felt secondary to vasovagal events. The patient denied strokes, seizures, headaches, other heart disorders, lung disease, connective tissue disorders, cancers, skin abnormalities, kidney disease or depression. Her surgeries included appendectomy, cholecystectomy, lumbosacral laminectomies, umbilical hernia repair and multiple lumpectomies for benign breast tumors. In addition, she underwent vascular decompression surgery for left trigeminal neuralgia, years ago at [facility]. She was allergic to penicillin, which produced a rash. She denied any trauma. She was originally from [place]. She was a  caring for her 2 blind children. Her daughter was also autistic. Her niece care for all 3. She previously worked as a caretaker. She never smoked, drank or use illicit drugs. There was no family history of neurological disease, except for congenital blindness in her children. She ate and slept moderately well. She denied any weight changes. She still cared for her household and children well, despite her advancing age. Review of systems was otherwise unremarkable, except as noted below. Many years ago she suffered from left facial pains, beginning as lightninglike and then persisting for minutes to hours. These discomforts involved only her lower jaw. She was treated with vascular decompression, with resolution of those discomforts. However, she was left with sensory loss over her left cheek and jaw.   Her family also noted minimal left facial droop. However, there were no other facial abnormalities, swallowing or chewing difficulties, speech issues or memory problems. 2 years ago she experienced right facial pains. These discomforts involved her right cheek and jaw. These were burning, stabbing sensations, lasting for minutes to hours. These were aggravated by touching her right face, swallowing or chewing. There was never associated trauma. She denied any forehead involvement, visual abnormalities, swallowing difficulties or cognitive decline. Her speech was soft during the spells. There was never arm or leg involvement. Unfortunately, these pains intensified over the past 3 months. She was initially examined in the hospital, when narcotics were delivered briefly. These resolved her discomforts. She was then evaluated by her primary care physician who reportedly prescribed different pain medications. However, these were never obtained. She requested medications specifically for this discomfort. Previous dental visits never revealed temporomandibular joint dysfunction. She denied other neurological or medical issues. Physical examination displayed stable vital signs. She was an elderly woman in minimal distress due to her pains, who was alert, cooperative and oriented. She was very pleasant. Her skin was unremarkable, with no lymphadenopathy. Head examination was unrevealing, with no tenderness along the right temporal mandibular joint. Her neck was supple without thyromegaly; there were +1 carotid upstrokes without bruits; there was full range of motion of her neck in all directions. Her spine revealed minimal gibbus deformities, without tenderness. Her lungs are clear to auscultation. Cardiac testing proved unremarkable. Her abdomen was also unrevealing. I found +1 peripheral pulses without bruits. Her limbs displayed no abnormalities. Neurological examination produced an intact mental status.

## 2020-09-14 ENCOUNTER — TELEPHONE (OUTPATIENT)
Dept: NEUROLOGY | Age: 85
End: 2020-09-14

## 2020-09-14 NOTE — TELEPHONE ENCOUNTER
Instructed aunt to instruct patient to increase her trileptal to 300mg bid and to call office in 3-4  Weeks.

## 2020-09-14 NOTE — TELEPHONE ENCOUNTER
Please have her increase the Trileptal to 300 mg twice daily, that is, 2 tablets twice daily. Please have her report back in 3 to 4 weeks.   Thank you

## 2020-09-14 NOTE — TELEPHONE ENCOUNTER
Pt's aunt called with freddie . Patient is still complaining of facial pain ,increased in severity when eating. Pt currently is taking  Oxcarbazepine 150mg bid. During last office visit , it was discussed a possible new medication be tried. Please advise?

## 2020-09-15 RX ORDER — OXCARBAZEPINE 150 MG/1
300 TABLET, FILM COATED ORAL 2 TIMES DAILY
Qty: 120 TABLET | Refills: 11 | Status: SHIPPED
Start: 2020-09-15 | End: 2022-07-13 | Stop reason: ALTCHOICE

## 2020-11-13 ENCOUNTER — TELEPHONE (OUTPATIENT)
Dept: NEUROLOGY | Age: 85
End: 2020-11-13

## 2020-11-13 NOTE — TELEPHONE ENCOUNTER
Called patient's daughter Carlos Enrique Gordillo to let her know that it is fine per Dr. Darren Hood to take her medication 3 times daily.

## 2020-11-16 ENCOUNTER — TELEPHONE (OUTPATIENT)
Dept: NEUROLOGY | Age: 85
End: 2020-11-16

## 2020-11-16 NOTE — TELEPHONE ENCOUNTER
Patient's niece called in today. Her aunt is having a lot of jaw pain not eating and crying out. Please advise.  Electronically signed by Kenny Tate MA on 11/16/20 at 8:13 AM EST

## 2020-11-16 NOTE — TELEPHONE ENCOUNTER
Please have her first increase her Trileptal to 3 tablets twice daily, that is, 450 mg twice daily. Please have him report back in 1 week. We may use numerous other medications if necessary.   Thank you

## 2020-11-17 NOTE — TELEPHONE ENCOUNTER
Patient's niece notified of Dr Jakub Mae \"s response. She is now asking if Gabapentin added to meds would help. Please advise.   Electronically signed by Jonathan Weaver MA on 11/17/20 at 8:00 AM EST

## 2020-11-18 RX ORDER — GABAPENTIN 100 MG/1
100 CAPSULE ORAL 3 TIMES DAILY
Qty: 90 CAPSULE | Refills: 5 | Status: SHIPPED
Start: 2020-11-18 | End: 2020-12-01 | Stop reason: ALTCHOICE

## 2020-11-18 NOTE — TELEPHONE ENCOUNTER
I have no objections to adding low-dose gabapentin for now. I will email prescription for 100 mg 3 times daily for now. Please have the family report back in several weeks.   Thank you

## 2020-11-24 ENCOUNTER — TELEPHONE (OUTPATIENT)
Dept: NEUROLOGY | Age: 85
End: 2020-11-24

## 2020-11-24 NOTE — TELEPHONE ENCOUNTER
Patient's daughter Carlos Enrique Gordillo called in stating that Russ Conley is having increased trigeminal pain and is up to 3 Trileptal BID, OK'd by Dr. Darren Hood. She cannot eat because she is in so much pain. He prescribed Gabapentin 100 mg TID. She took one capsule yesterday afternoon, and she got sick to her stomach and dizzy to where she could not get up and function. Carlos Enrique Gordillo spoke with her this morning and Russ Conley told her she was just getting over the side effects. 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 Dr. Darren Hood is out of office until next week. Forwarding to [employer]. Please advise.

## 2020-11-25 NOTE — TELEPHONE ENCOUNTER
Spoke with patient's daughter [facility] and explained Rae's response. Patient has appointment with Dr. Teto Thomas on 12/1/2020 and will go to the ER if she needs to.

## 2020-12-01 ENCOUNTER — OFFICE VISIT (OUTPATIENT)
Dept: NEUROLOGY | Age: 85
End: 2020-12-01
Payer: MEDICARE

## 2020-12-01 DIAGNOSIS — G50.1 ATYPICAL FACIAL PAIN: Primary | ICD-10-CM

## 2020-12-01 PROCEDURE — 99214 OFFICE O/P EST MOD 30 MIN: CPT | Performed by: PSYCHIATRY & NEUROLOGY

## 2020-12-01 RX ORDER — DULOXETIN HYDROCHLORIDE 30 MG/1
30 CAPSULE, DELAYED RELEASE ORAL DAILY
Qty: 30 CAPSULE | Refills: 5 | Status: ON HOLD
Start: 2020-12-01 | End: 2021-01-02

## 2020-12-01 NOTE — PROGRESS NOTES
Britt Sidhu was read the following message We want to confirm that, for purposes of billing, this is a virtual visit with your provider for which we will submit a claim for reimbursement with your insurance company. You will be responsible for any copays, coinsurance amounts or other amounts not covered by your insurance company. If you do not accept this, unfortunately we will not be able to schedule a virtual visit with the provider. Do you accept? Sabrina Farrell responded Yes .

## 2020-12-01 NOTE — PROGRESS NOTES
This 61-year-old right-handed woman was referred for evaluation and management of right facial pains. The patient and her niece continued to be excellent historians. This visit was per telephone. Her medications were now Trileptal, metoprolol, levothyroxine, multivitamins and iron supplementations. Her past medical history was remarkable for hypertension and hypothyroidism, well controlled with metoprolol alone. She had experienced syncope in the past, felt secondary to vasovagal events. She again denied strokes, seizures, headaches, other heart disorders, lung disease, connective tissue disorders, cancers, skin abnormalities, kidney disease or depression. She slept moderately well. Her eating was impaired due to her marked right facial pains. Fortunately, she had not lost weight; her diet was supplemented with high protein drinks. She still cared for her household and children well, despite her advancing age. Unfortunately, her 1 son was dying of metastatic prostatic cancer. He continued living at home, with hospice care. Years ago she suffered from left facial pains, lightninglike sensations but then persisting for minutes to hours. These discomforts involved only her lower jaw. She was treated with vascular decompression, with excellent resolution of those discomforts. She was left with sensory loss over her left cheek and jaw. Her niece again noted minimal left facial droop. However, there were no other facial abnormalities, swallowing or chewing difficulties, speech issues or memory problems. Over 2 years ago she experienced right facial pains. These discomforts involved her right cheek and jaw. These were burning, stabbing sensations, lasting for minutes to hours. These were aggravated by touching her right face, swallowing or chewing. She again denied any forehead involvement, visual abnormalities, swallowing difficulties or cognitive decline.   Her speech was soft during the spells. There was never arm or leg involvement. I diagnosed her with atypical facial pains. Fortunately, she reported no marked discomforts with Trileptal 450 mg twice daily with all activities except for eating. These pains continued unabated while attempting to chew or placing food on the right side of her mouth. She requested additional medications. Unfortunately, she could not tolerate gabapentin in the past.    Previous dental visits never revealed temporomandibular joint dysfunction. She denied other neurological or medical issues. Her niece privately told me there were marked stressors due to her dying son at home; she felt these stressors were worsening her pains as well. Review of systems was otherwise unremarkable. Physical examination displayed stable vital signs. She was an elderly woman in minimal distress due to her pains, who was alert, cooperative and oriented. She remained very pleasant. Her skin was unremarkable. Head examination was unrevealing, with no tenderness along the right temporal mandibular joint. She was breathing comfortably. There were no chest pains or palpitations. Her limbs displayed no abnormalities. Neurological examination produced an intact mental status. Her niece noted decreased sensations along the second and third division of the left 5th cranial nerve, as well as minimal left facial drooping, correcting with smiling. There was no sensory loss over the right side of her face, but touching the right cheek and jaw again produced pain. Her niece observed normal bulk with 5/5 strength throughout. Light touch and pinprick were intact. There were no clumsy movements. She displayed an antalgic gait, otherwise walking well. Laboratory data included a recent unremarkable CMP, with a GFR of 60. CBC with differential was unrevealing. A CT scan of her face revealed minimal temporomandibular joint arthritis.   A suspicious area of necrosis was noted in the right anterior mandible. An MRI of her brain from 1 year ago displayed minimal, remote small vessel disease. This individual presented with longstanding left trigeminal neuralgia and atypical facial pains, resolving with vascular decompression surgery. Unfortunately, she now experienced atypical right facial pains. I again do not feel her CT abnormalities of the right mandible are responsible. She has responded only modestly to Trileptal.  At present, I will add duloxetine 30 mg at night, to hopefully improve her pains and reduce her home stressors. She is otherwise stable neurologically and medically. She will return in 4 months. Trileptal was maintained at 450 mg twice daily. Duloxetine was added at 30 mg per night. Her niece will report back in 3 to 4 weeks. The patient and her niece will call at any time if problems arise. I spent 30 minutes with the patient with over 50 % spent in counseling and disease management. All patient issues were addressed and all questions were answered. Shahab Lujan is a 80 y. o.woman who was evaluated via telephone on 12/1/2020. Consent:  She and/or health care decision maker is aware that that she may receive a bill for this telephone service, depending on her insurance coverage, and has provided verbal consent to proceed--yes. Documentation:  I communicated with the patient and/or health care decision maker about her facial pains. Details of this discussion including any medical advice provided per telephone. I affirmed this is a Patient Initiated Episode with an Established Patient who has not had a related appointment within my department in the past 7 days or scheduled within the next 24 hours. Again I spent 30 minutes with the patient.       Roselyn Hood

## 2021-01-02 ENCOUNTER — HOSPITAL ENCOUNTER (INPATIENT)
Age: 86
LOS: 5 days | Discharge: HOME | DRG: 177 | End: 2021-01-07
Attending: EMERGENCY MEDICINE | Admitting: FAMILY MEDICINE
Payer: MEDICARE

## 2021-01-02 ENCOUNTER — APPOINTMENT (OUTPATIENT)
Dept: GENERAL RADIOLOGY | Age: 86
DRG: 177 | End: 2021-01-02
Payer: MEDICARE

## 2021-01-02 DIAGNOSIS — R09.02 HYPOXIA: Primary | ICD-10-CM

## 2021-01-02 DIAGNOSIS — U07.1 COVID-19: ICD-10-CM

## 2021-01-02 PROBLEM — J96.00 ACUTE RESPIRATORY FAILURE DUE TO COVID-19 (HCC): Status: ACTIVE | Noted: 2021-01-02

## 2021-01-02 LAB
ALBUMIN SERPL-MCNC: 3.8 G/DL (ref 3.5–5.2)
ALBUMIN SERPL-MCNC: 4.2 G/DL (ref 3.5–5.2)
ALP BLD-CCNC: 102 U/L (ref 35–104)
ALP BLD-CCNC: 107 U/L (ref 35–104)
ALT SERPL-CCNC: 11 U/L (ref 0–32)
ALT SERPL-CCNC: 11 U/L (ref 0–32)
ANION GAP SERPL CALCULATED.3IONS-SCNC: 7 MMOL/L (ref 7–16)
ANION GAP SERPL CALCULATED.3IONS-SCNC: 7 MMOL/L (ref 7–16)
AST SERPL-CCNC: 25 U/L (ref 0–31)
AST SERPL-CCNC: 27 U/L (ref 0–31)
BASOPHILS ABSOLUTE: 0.01 E9/L (ref 0–0.2)
BASOPHILS ABSOLUTE: 0.01 E9/L (ref 0–0.2)
BASOPHILS RELATIVE PERCENT: 0.2 % (ref 0–2)
BASOPHILS RELATIVE PERCENT: 0.3 % (ref 0–2)
BILIRUB SERPL-MCNC: 0.3 MG/DL (ref 0–1.2)
BILIRUB SERPL-MCNC: 0.3 MG/DL (ref 0–1.2)
BUN BLDV-MCNC: 15 MG/DL (ref 8–23)
BUN BLDV-MCNC: 20 MG/DL (ref 8–23)
CALCIUM SERPL-MCNC: 9.1 MG/DL (ref 8.6–10.2)
CALCIUM SERPL-MCNC: 9.7 MG/DL (ref 8.6–10.2)
CHLORIDE BLD-SCNC: 100 MMOL/L (ref 98–107)
CHLORIDE BLD-SCNC: 99 MMOL/L (ref 98–107)
CO2: 27 MMOL/L (ref 22–29)
CO2: 28 MMOL/L (ref 22–29)
CREAT SERPL-MCNC: 0.9 MG/DL (ref 0.5–1)
CREAT SERPL-MCNC: 1 MG/DL (ref 0.5–1)
D DIMER: 325 NG/ML DDU
EOSINOPHILS ABSOLUTE: 0.01 E9/L (ref 0.05–0.5)
EOSINOPHILS ABSOLUTE: 0.02 E9/L (ref 0.05–0.5)
EOSINOPHILS RELATIVE PERCENT: 0.2 % (ref 0–6)
EOSINOPHILS RELATIVE PERCENT: 0.6 % (ref 0–6)
FIBRINOGEN: 461 MG/DL (ref 225–540)
GFR AFRICAN AMERICAN: >60
GFR AFRICAN AMERICAN: >60
GFR NON-AFRICAN AMERICAN: 53 ML/MIN/1.73
GFR NON-AFRICAN AMERICAN: 59 ML/MIN/1.73
GLUCOSE BLD-MCNC: 107 MG/DL (ref 74–99)
GLUCOSE BLD-MCNC: 122 MG/DL (ref 74–99)
HCT VFR BLD CALC: 38.8 % (ref 34–48)
HCT VFR BLD CALC: 41.3 % (ref 34–48)
HEMOGLOBIN: 12.4 G/DL (ref 11.5–15.5)
HEMOGLOBIN: 13.1 G/DL (ref 11.5–15.5)
IMMATURE GRANULOCYTES #: 0.01 E9/L
IMMATURE GRANULOCYTES #: 0.01 E9/L
IMMATURE GRANULOCYTES %: 0.2 % (ref 0–5)
IMMATURE GRANULOCYTES %: 0.3 % (ref 0–5)
INR BLD: 0.9
LACTATE DEHYDROGENASE: 271 U/L (ref 135–214)
LACTIC ACID: 0.9 MMOL/L (ref 0.5–2.2)
LACTIC ACID: 0.9 MMOL/L (ref 0.5–2.2)
LYMPHOCYTES ABSOLUTE: 0.8 E9/L (ref 1.5–4)
LYMPHOCYTES ABSOLUTE: 1.25 E9/L (ref 1.5–4)
LYMPHOCYTES RELATIVE PERCENT: 25.2 % (ref 20–42)
LYMPHOCYTES RELATIVE PERCENT: 27.3 % (ref 20–42)
MCH RBC QN AUTO: 29.4 PG (ref 26–35)
MCH RBC QN AUTO: 29.4 PG (ref 26–35)
MCHC RBC AUTO-ENTMCNC: 31.7 % (ref 32–34.5)
MCHC RBC AUTO-ENTMCNC: 32 % (ref 32–34.5)
MCV RBC AUTO: 91.9 FL (ref 80–99.9)
MCV RBC AUTO: 92.6 FL (ref 80–99.9)
MONOCYTES ABSOLUTE: 0.21 E9/L (ref 0.1–0.95)
MONOCYTES ABSOLUTE: 0.54 E9/L (ref 0.1–0.95)
MONOCYTES RELATIVE PERCENT: 11.8 % (ref 2–12)
MONOCYTES RELATIVE PERCENT: 6.6 % (ref 2–12)
NEUTROPHILS ABSOLUTE: 2.12 E9/L (ref 1.8–7.3)
NEUTROPHILS ABSOLUTE: 2.76 E9/L (ref 1.8–7.3)
NEUTROPHILS RELATIVE PERCENT: 60.3 % (ref 43–80)
NEUTROPHILS RELATIVE PERCENT: 67 % (ref 43–80)
PDW BLD-RTO: 13.8 FL (ref 11.5–15)
PDW BLD-RTO: 13.8 FL (ref 11.5–15)
PLATELET # BLD: 184 E9/L (ref 130–450)
PLATELET # BLD: 205 E9/L (ref 130–450)
PMV BLD AUTO: 10 FL (ref 7–12)
PMV BLD AUTO: 9.9 FL (ref 7–12)
POTASSIUM REFLEX MAGNESIUM: 4.3 MMOL/L (ref 3.5–5)
POTASSIUM SERPL-SCNC: 4.3 MMOL/L (ref 3.5–5)
PRO-BNP: 123 PG/ML (ref 0–450)
PROTHROMBIN TIME: 10.5 SEC (ref 9.3–12.4)
RBC # BLD: 4.22 E12/L (ref 3.5–5.5)
RBC # BLD: 4.46 E12/L (ref 3.5–5.5)
SARS-COV-2, NAAT: DETECTED
SODIUM BLD-SCNC: 134 MMOL/L (ref 132–146)
SODIUM BLD-SCNC: 134 MMOL/L (ref 132–146)
TOTAL PROTEIN: 7.2 G/DL (ref 6.4–8.3)
TOTAL PROTEIN: 7.4 G/DL (ref 6.4–8.3)
TROPONIN: 0.01 NG/ML (ref 0–0.03)
TROPONIN: 0.02 NG/ML (ref 0–0.03)
WBC # BLD: 3.2 E9/L (ref 4.5–11.5)
WBC # BLD: 4.6 E9/L (ref 4.5–11.5)

## 2021-01-02 PROCEDURE — 2580000003 HC RX 258: Performed by: GENERAL PRACTICE

## 2021-01-02 PROCEDURE — XW033E5 INTRODUCTION OF REMDESIVIR ANTI-INFECTIVE INTO PERIPHERAL VEIN, PERCUTANEOUS APPROACH, NEW TECHNOLOGY GROUP 5: ICD-10-PCS | Performed by: STUDENT IN AN ORGANIZED HEALTH CARE EDUCATION/TRAINING PROGRAM

## 2021-01-02 PROCEDURE — 1200000000 HC SEMI PRIVATE

## 2021-01-02 PROCEDURE — 80053 COMPREHEN METABOLIC PANEL: CPT

## 2021-01-02 PROCEDURE — 86140 C-REACTIVE PROTEIN: CPT

## 2021-01-02 PROCEDURE — 84484 ASSAY OF TROPONIN QUANT: CPT

## 2021-01-02 PROCEDURE — 0202U NFCT DS 22 TRGT SARS-COV-2: CPT

## 2021-01-02 PROCEDURE — U0002 COVID-19 LAB TEST NON-CDC: HCPCS

## 2021-01-02 PROCEDURE — 2580000003 HC RX 258: Performed by: FAMILY MEDICINE

## 2021-01-02 PROCEDURE — 6360000002 HC RX W HCPCS: Performed by: GENERAL PRACTICE

## 2021-01-02 PROCEDURE — 36415 COLL VENOUS BLD VENIPUNCTURE: CPT

## 2021-01-02 PROCEDURE — 85025 COMPLETE CBC W/AUTO DIFF WBC: CPT

## 2021-01-02 PROCEDURE — 82306 VITAMIN D 25 HYDROXY: CPT

## 2021-01-02 PROCEDURE — 6370000000 HC RX 637 (ALT 250 FOR IP): Performed by: FAMILY MEDICINE

## 2021-01-02 PROCEDURE — 83605 ASSAY OF LACTIC ACID: CPT

## 2021-01-02 PROCEDURE — 83615 LACTATE (LD) (LDH) ENZYME: CPT

## 2021-01-02 PROCEDURE — 2500000003 HC RX 250 WO HCPCS: Performed by: GENERAL PRACTICE

## 2021-01-02 PROCEDURE — 99223 1ST HOSP IP/OBS HIGH 75: CPT | Performed by: FAMILY MEDICINE

## 2021-01-02 PROCEDURE — 71046 X-RAY EXAM CHEST 2 VIEWS: CPT

## 2021-01-02 PROCEDURE — 6360000002 HC RX W HCPCS: Performed by: FAMILY MEDICINE

## 2021-01-02 PROCEDURE — 99284 EMERGENCY DEPT VISIT MOD MDM: CPT

## 2021-01-02 PROCEDURE — 82728 ASSAY OF FERRITIN: CPT

## 2021-01-02 PROCEDURE — 85610 PROTHROMBIN TIME: CPT

## 2021-01-02 PROCEDURE — 83880 ASSAY OF NATRIURETIC PEPTIDE: CPT

## 2021-01-02 PROCEDURE — 84145 PROCALCITONIN (PCT): CPT

## 2021-01-02 PROCEDURE — 85384 FIBRINOGEN ACTIVITY: CPT

## 2021-01-02 PROCEDURE — 85378 FIBRIN DEGRADE SEMIQUANT: CPT

## 2021-01-02 RX ORDER — DEXAMETHASONE SODIUM PHOSPHATE 10 MG/ML
6 INJECTION, SOLUTION INTRAMUSCULAR; INTRAVENOUS EVERY 6 HOURS
Status: DISCONTINUED | OUTPATIENT
Start: 2021-01-02 | End: 2021-01-02 | Stop reason: DRUGHIGH

## 2021-01-02 RX ORDER — ZINC SULFATE 50(220)MG
50 CAPSULE ORAL DAILY
Status: DISCONTINUED | OUTPATIENT
Start: 2021-01-02 | End: 2021-01-07 | Stop reason: HOSPADM

## 2021-01-02 RX ORDER — 0.9 % SODIUM CHLORIDE 0.9 %
30 INTRAVENOUS SOLUTION INTRAVENOUS PRN
Status: DISCONTINUED | OUTPATIENT
Start: 2021-01-02 | End: 2021-01-07 | Stop reason: HOSPADM

## 2021-01-02 RX ORDER — ACETAMINOPHEN 325 MG/1
650 TABLET ORAL EVERY 6 HOURS PRN
Status: DISCONTINUED | OUTPATIENT
Start: 2021-01-02 | End: 2021-01-07 | Stop reason: HOSPADM

## 2021-01-02 RX ORDER — ACETAMINOPHEN 650 MG/1
650 SUPPOSITORY RECTAL EVERY 6 HOURS PRN
Status: DISCONTINUED | OUTPATIENT
Start: 2021-01-02 | End: 2021-01-07 | Stop reason: HOSPADM

## 2021-01-02 RX ORDER — FERROUS SULFATE 325(65) MG
325 TABLET ORAL
COMMUNITY
End: 2021-04-01 | Stop reason: ALTCHOICE

## 2021-01-02 RX ORDER — FAMOTIDINE 20 MG/1
20 TABLET, FILM COATED ORAL 2 TIMES DAILY
Status: DISCONTINUED | OUTPATIENT
Start: 2021-01-02 | End: 2021-01-07 | Stop reason: HOSPADM

## 2021-01-02 RX ORDER — PROMETHAZINE HYDROCHLORIDE 25 MG/1
12.5 TABLET ORAL EVERY 6 HOURS PRN
Status: DISCONTINUED | OUTPATIENT
Start: 2021-01-02 | End: 2021-01-02 | Stop reason: SINTOL

## 2021-01-02 RX ORDER — POLYETHYLENE GLYCOL 3350 17 G/17G
17 POWDER, FOR SOLUTION ORAL DAILY PRN
Status: DISCONTINUED | OUTPATIENT
Start: 2021-01-02 | End: 2021-01-07 | Stop reason: HOSPADM

## 2021-01-02 RX ORDER — ASCORBIC ACID 500 MG
1000 TABLET ORAL DAILY
Status: DISCONTINUED | OUTPATIENT
Start: 2021-01-02 | End: 2021-01-07 | Stop reason: HOSPADM

## 2021-01-02 RX ORDER — SODIUM CHLORIDE 0.9 % (FLUSH) 0.9 %
10 SYRINGE (ML) INJECTION PRN
Status: DISCONTINUED | OUTPATIENT
Start: 2021-01-02 | End: 2021-01-07 | Stop reason: HOSPADM

## 2021-01-02 RX ORDER — DULOXETIN HYDROCHLORIDE 30 MG/1
30 CAPSULE, DELAYED RELEASE ORAL DAILY
Status: DISCONTINUED | OUTPATIENT
Start: 2021-01-02 | End: 2021-01-07 | Stop reason: HOSPADM

## 2021-01-02 RX ORDER — VITAMIN B COMPLEX
2000 TABLET ORAL DAILY
Status: DISCONTINUED | OUTPATIENT
Start: 2021-01-02 | End: 2021-01-07 | Stop reason: HOSPADM

## 2021-01-02 RX ORDER — GUAIFENESIN/DEXTROMETHORPHAN 100-10MG/5
5 SYRUP ORAL EVERY 4 HOURS PRN
Status: DISCONTINUED | OUTPATIENT
Start: 2021-01-02 | End: 2021-01-07 | Stop reason: HOSPADM

## 2021-01-02 RX ORDER — SODIUM CHLORIDE 0.9 % (FLUSH) 0.9 %
10 SYRINGE (ML) INJECTION EVERY 12 HOURS SCHEDULED
Status: DISCONTINUED | OUTPATIENT
Start: 2021-01-02 | End: 2021-01-07 | Stop reason: HOSPADM

## 2021-01-02 RX ORDER — ONDANSETRON 2 MG/ML
4 INJECTION INTRAMUSCULAR; INTRAVENOUS EVERY 6 HOURS PRN
Status: DISCONTINUED | OUTPATIENT
Start: 2021-01-02 | End: 2021-01-02 | Stop reason: SINTOL

## 2021-01-02 RX ORDER — OXCARBAZEPINE 150 MG/1
300 TABLET, FILM COATED ORAL 2 TIMES DAILY
Status: DISCONTINUED | OUTPATIENT
Start: 2021-01-02 | End: 2021-01-07 | Stop reason: HOSPADM

## 2021-01-02 RX ADMIN — OXCARBAZEPINE 300 MG: 150 TABLET, FILM COATED ORAL at 20:55

## 2021-01-02 RX ADMIN — METOPROLOL TARTRATE 25 MG: 25 TABLET, FILM COATED ORAL at 20:55

## 2021-01-02 RX ADMIN — DEXAMETHASONE 6 MG: 4 TABLET ORAL at 20:55

## 2021-01-02 RX ADMIN — FAMOTIDINE 20 MG: 20 TABLET, FILM COATED ORAL at 20:56

## 2021-01-02 RX ADMIN — OXYCODONE HYDROCHLORIDE AND ACETAMINOPHEN 1000 MG: 500 TABLET ORAL at 20:56

## 2021-01-02 RX ADMIN — ZINC SULFATE 220 MG (50 MG) CAPSULE 50 MG: CAPSULE at 20:56

## 2021-01-02 RX ADMIN — ENOXAPARIN SODIUM 30 MG: 30 INJECTION SUBCUTANEOUS at 20:55

## 2021-01-02 RX ADMIN — DEXAMETHASONE SODIUM PHOSPHATE 10 MG: 10 INJECTION, SOLUTION INTRAMUSCULAR; INTRAVENOUS at 19:26

## 2021-01-02 RX ADMIN — Medication 2000 UNITS: at 20:56

## 2021-01-02 RX ADMIN — Medication 10 ML: at 20:55

## 2021-01-02 RX ADMIN — REMDESIVIR 200 MG: 100 INJECTION, POWDER, LYOPHILIZED, FOR SOLUTION INTRAVENOUS at 19:26

## 2021-01-02 ASSESSMENT — ENCOUNTER SYMPTOMS
SORE THROAT: 0
SHORTNESS OF BREATH: 0
DIARRHEA: 0
EYE PAIN: 0
COUGH: 0
VOMITING: 0
SINUS PAIN: 0
CHEST TIGHTNESS: 0
NAUSEA: 0
ABDOMINAL PAIN: 0
WHEEZING: 0
CHOKING: 0

## 2021-01-02 ASSESSMENT — PAIN SCALES - GENERAL: PAINLEVEL_OUTOF10: 0

## 2021-01-02 NOTE — ED PROVIDER NOTES
Brian Perez is an 80-year-old female who presents with a chief complaint of shortness of breath. History comes primarily from the patient. Past medical history includes hypertension, hypothyroidism, left bundle branch block, trigeminal neuralgia. Shortness of breath has been going on for the last week, is worse in the mornings, is better in the afternoons, is not associated with other symptoms, is moderate in severity. Patient also states that she is a diarrhea for the last 24 hours, and has trigeminal neuralgia that causes her to have pain with eating and therefore has not eaten for the last several days. She also states that she has significant stressors at home as she is taking care of 2 completely blind adult children, 1 of whom is dying of prostate cancer and has hospice caring for him in the home. For this reason, she was brought into [facility] emergency department for further evaluation and treatment. On arrival, she was assessed with history, physical exam, imaging studies, laboratory studies, EKG, vital signs. Vital signs were stable on arrival and she was afebrile. Review of Systems   Constitutional: Negative for appetite change, chills and fever. HENT: Negative for sinus pain and sore throat. Eyes: Negative for pain and visual disturbance. Respiratory: Negative for cough, choking, chest tightness, shortness of breath and wheezing. Cardiovascular: Negative for chest pain and palpitations. Gastrointestinal: Negative for abdominal pain, diarrhea, nausea and vomiting. Genitourinary: Negative for hematuria. Musculoskeletal: Negative for neck pain and neck stiffness. Skin: Negative for rash. Neurological: Negative for tremors, syncope and weakness. Psychiatric/Behavioral: Negative for confusion. Physical Exam  Vitals signs and nursing note reviewed. Constitutional:       Appearance: She is well-developed. She is obese.    HENT:      Head: Normocephalic and atraumatic. Eyes:      Pupils: Pupils are equal, round, and reactive to light. Neck:      Musculoskeletal: Normal range of motion and neck supple. Cardiovascular:      Rate and Rhythm: Normal rate and regular rhythm. Pulmonary:      Effort: Pulmonary effort is normal. No respiratory distress. Breath sounds: Normal breath sounds. No wheezing or rales. Abdominal:      General: Bowel sounds are normal.      Palpations: Abdomen is soft. Tenderness: There is no abdominal tenderness. There is no guarding or rebound. Skin:     General: Skin is warm and dry. Neurological:      General: No focal deficit present. Mental Status: She is alert and oriented to person, place, and time. Cranial Nerves: No cranial nerve deficit. Coordination: Coordination normal.          Procedures     MDM  Number of Diagnoses or Management Options  COVID-19  Hypoxia  Diagnosis management comments: Emergency department evaluation was notable for significant desaturation into the low 80s with ambulation in the setting of a Covid positive diagnosis. This places the patient at high risk for acute respiratory failure should she be discharged home, and therefore will benefit from IV medication and close inpatient monitoring. This information was relayed to the patient who understood this plan of care and was amenable to the plan. Patient was discussed with the admitting service (Dr. Nela Hughes) who concurred with the decision for admission, and have agreed to admit the patient to the [facility]       ED Course as of Jan 02 1908   Sat Jan 02, 2021   1708 ATTENDING PHYSICIAN ATTESTATION:     I have personally performed and/or participated in the history, exam, medical decision making, and procedures and agree with all pertinent clinical information. I have also reviewed and agree with the past medical, family and social history unless otherwise noted.     I have discussed this patient in detail with the resident, and provided the instruction and education regarding COVID-19. My findings/Plan: 80-year-old female with intermittent episodes of shortness of breath in the mornings only resolves by 10 or 11 but MS is a diarrhea and decreased appetite. Patient is well-appearing, nontoxic work-up here is fairly unremarkable except for a positive COVID-19 infection. Patient with normal vital signs fairly unremarkable chest x-ray. However unfortunately patient's oxygen saturation dropped to 85% with ambulation requiring admission. [MF]   6631 Patient desaturated to 85% during ambulation. [RK]      ED Course User Index  [MF] Mary Kate Collins DO  [RK] Theron Gore DO        ED Course as of Jan 02 1910   Sat Jan 02, 2021   1708 ATTENDING PHYSICIAN ATTESTATION:     I have personally performed and/or participated in the history, exam, medical decision making, and procedures and agree with all pertinent clinical information. I have also reviewed and agree with the past medical, family and social history unless otherwise noted. I have discussed this patient in detail with the resident, and provided the instruction and education regarding COVID-19. My findings/Plan: 80-year-old female with intermittent episodes of shortness of breath in the mornings only resolves by 10 or 11 but MS is a diarrhea and decreased appetite. Patient is well-appearing, nontoxic work-up here is fairly unremarkable except for a positive COVID-19 infection. Patient with normal vital signs fairly unremarkable chest x-ray. However unfortunately patient's oxygen saturation dropped to 85% with ambulation requiring admission. [MF]   8912 Patient desaturated to 85% during ambulation.     [RK]      ED Course User Index  [MF] Mary Kate Collins DO  [RK] Theron Gore DO       --------------------------------------------- PAST HISTORY ---------------------------------------------  Past Medical History:  has a past medical history of Allergic rhinitis, Chronic back pain, Dizziness and giddiness, Hypertension, Hypothyroidism, Iron deficiency, Neuropathy, Seizures ([address] and Vitamin B12 deficiency. Past Surgical History:  has a past surgical history that includes Hysterectomy; Appendectomy; Cholecystectomy; hernia repair (2002/2003); back surgery (MORE 10 YRS ); Breast surgery; Facial nerve surgery (2008); and Colonoscopy (11/1/2011). Social History:  reports that she has never smoked. She has never used smokeless tobacco. She reports that she does not drink alcohol or use drugs. Family History: family history includes Arthritis in her father and mother; Cancer in her brother, father, and sister; Diabetes in her brother, mother, and sister; Heart Disease in her brother, mother, and sister. The patients home medications have been reviewed.     Allergies: Aspirin-acetaminophen-caffeine and Pcn [penicillins]    -------------------------------------------------- RESULTS -------------------------------------------------    LABS:  Results for orders placed or performed during the hospital encounter of 01/02/21   CBC Auto Differential   Result Value Ref Range    WBC 4.6 4.5 - 11.5 E9/L    RBC 4.46 3.50 - 5.50 E12/L    Hemoglobin 13.1 11.5 - 15.5 g/dL    Hematocrit 41.3 34.0 - 48.0 %    MCV 92.6 80.0 - 99.9 fL    MCH 29.4 26.0 - 35.0 pg    MCHC 31.7 (L) 32.0 - 34.5 %    RDW 13.8 11.5 - 15.0 fL    Platelets 422 008 - 648 E9/L    MPV 9.9 7.0 - 12.0 fL    Neutrophils % 60.3 43.0 - 80.0 %    Immature Granulocytes % 0.2 0.0 - 5.0 %    Lymphocytes % 27.3 20.0 - 42.0 %    Monocytes % 11.8 2.0 - 12.0 %    Eosinophils % 0.2 0.0 - 6.0 %    Basophils % 0.2 0.0 - 2.0 %    Neutrophils Absolute 2.76 1.80 - 7.30 E9/L    Immature Granulocytes # 0.01 E9/L    Lymphocytes Absolute 1.25 (L) 1.50 - 4.00 E9/L    Monocytes Absolute 0.54 0.10 - 0.95 E9/L    Eosinophils Absolute 0.01 (L) 0.05 - 0.50 E9/L    Basophils Absolute 0.01 0.00 - 0.20 E9/L   Comprehensive Metabolic Panel   Result Value Ref Range    Sodium 134 132 - 146 mmol/L    Potassium 4.3 3.5 - 5.0 mmol/L    Chloride 100 98 - 107 mmol/L    CO2 27 22 - 29 mmol/L    Anion Gap 7 7 - 16 mmol/L    Glucose 107 (H) 74 - 99 mg/dL    BUN 20 8 - 23 mg/dL    CREATININE 1.0 0.5 - 1.0 mg/dL    GFR Non-African American 53 >=60 mL/min/1.73    GFR African American >60     Calcium 9.7 8.6 - 10.2 mg/dL    Total Protein 7.4 6.4 - 8.3 g/dL    Alb 4.2 3.5 - 5.2 g/dL    Total Bilirubin 0.3 0.0 - 1.2 mg/dL    Alkaline Phosphatase 107 (H) 35 - 104 U/L    ALT 11 0 - 32 U/L    AST 25 0 - 31 U/L   Troponin   Result Value Ref Range    Troponin 0.02 0.00 - 0.03 ng/mL   Lactic Acid, Plasma   Result Value Ref Range    Lactic Acid 0.9 0.5 - 2.2 mmol/L   Brain Natriuretic Peptide   Result Value Ref Range    Pro- 0 - 450 pg/mL   COVID-19   Result Value Ref Range    SARS-CoV-2, NAAT DETECTED (A) Not Detected       RADIOLOGY:  XR CHEST (2 VW)   Final Result   No pneumonia or pleural effusion.          ------------------------- NURSING NOTES AND VITALS REVIEWED ---------------------------  Date / Time Roomed:  1/2/2021  2:52 PM  ED Bed Assignment:  21/21    The nursing notes within the ED encounter and vital signs as below have been reviewed. Patient Vitals for the past 24 hrs:   BP Temp Temp src Pulse Resp SpO2 Height Weight   01/02/21 1404 139/63 97.1 °F (36.2 °C) Temporal 58 18 95 % 5' 1\" (1.549 m) 179 lb (81.2 kg)       Oxygen Saturation Interpretation: Abnormal and Improved after treatment    ------------------------------------------ PROGRESS NOTES ------------------------------------------  Re-evaluation(s):  Time: 7:10 PM EST  Patients symptoms show no change  Repeat physical examination is not changed    Counseling:  I have spoken with the patient and discussed todays results, in addition to providing specific details for the plan of care and counseling regarding the diagnosis and prognosis.   Their questions are answered at this time and they are agreeable with the plan of admission.    --------------------------------- ADDITIONAL PROVIDER NOTES ---------------------------------  Consultations:  Time: 7:11 PM EST. Spoke with Dr. Roro Dyer. Discussed case. They will admit the patient. This patient's ED course included: a personal history and physicial examination, re-evaluation prior to disposition, multiple bedside re-evaluations, IV medications and continuous pulse oximetry    This patient has remained hemodynamically stable during their ED course. Diagnosis:  1. Hypoxia    2. COVID-19        Disposition:  Patient's disposition: Admit to COVID unit  Patient's condition is fair.            Mone Crowley 43., DO  Resident  01/02/21 1911

## 2021-01-02 NOTE — H&P
[facility] History and Physical      CHIEF COPLAINT:  Fatigue for past 3 days    History of Present Illness:  Patient is a 81 yo female who states she has not gone out of her house since August except for Wednesday she went shopping. 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 She stated feeling zapped of energy and tired all the time. She has not eaten or drank anything. This is due to a trigeminal neuralgia which has caused her pain with chewing. She reports an intermittent cough, nausea. This morning she developed shortness of breath and uncontrollable diarrhea. She was evaluated in the ED and was found to be COVID 19 positive. Admission was advised due to hypoxia on exertion. She reports her children are not sick. Her son is under the care of Hospice for prostate cancer. Patient denies fever, chills, rash, diaphoresis,  complaints. She reports a Headache. Denies chest pains. Informant(s) for H&P:  Self    REVIEW OF SYSTEMS:  A comprehensive review of systems was negative except for: what is in the HPI    PMH:  Past Medical History:   Diagnosis Date    Allergic rhinitis     Chronic back pain     Dizziness and giddiness 2/25/2019    Hypertension     Hypothyroidism     Iron deficiency 2/25/2019    Neuropathy     Seizures ([address]     Vitamin B12 deficiency 2/25/2019       Surgical History:  Past Surgical History:   Procedure Laterality Date    APPENDECTOMY      BACK SURGERY  MORE 10 YRS     LUMBAR    BREAST SURGERY      MORE 10 YRS    CHOLECYSTECTOMY      COLONOSCOPY  11/1/2011    FACIAL NERVE SURGERY  2008    HERNIA REPAIR  2002/2003    HYSTERECTOMY         Medications Prior to Admission:    Prior to Admission medications    Medication Sig Start Date End Date Taking?  Authorizing Provider   OXcarbazepine (TRILEPTAL) 150 MG tablet Take 2 tablets by mouth 2 times daily  Patient taking differently: Take 300 mg by mouth 3 times daily 9/15/20  Yes Dana Solomon MD   metoprolol tartrate (LOPRESSOR) 25 MG tablet TAKE ONE TABLET BY MOUTH TWICE A DAY WITH FOOD 9/3/18  Yes Historical Provider, MD   levothyroxine (SYNTHROID) 137 MCG tablet Take 137 mcg by mouth Daily. Yes Historical Provider, MD   DULoxetine (CYMBALTA) 30 MG extended release capsule Take 1 capsule by mouth daily 12/1/20 5/30/21  Dana Solomon MD       Allergies:    Aspirin-acetaminophen-caffeine and Pcn [penicillins]    Social History:    reports that she has never smoked. She has never used smokeless tobacco. She reports that she does not drink alcohol or use drugs. Family History:   family history includes Arthritis in her father and mother; Cancer in her brother, father, and sister; Diabetes in her brother, mother, and sister; Heart Disease in her brother, mother, and sister.      PHYSICAL EXAM:  Vitals:  /63   Pulse 58   Temp 97.1 °F (36.2 °C) (Temporal)   Resp 18   Ht 5' 1\" (1.549 m)   Wt 179 lb (81.2 kg)   SpO2 95%   BMI 33.82 kg/m²     General Appearance: alert and oriented to person, place and time and in no acute distress  Skin: warm and dry  Head: normocephalic and atraumatic  Eyes: pupils equal, round, and reactive to light, extraocular eye movements intact, conjunctivae normal  Neck: neck supple and non tender without mass   Pulmonary/Chest: clear to auscultation bilaterally- no wheezes, rales or rhonchi, normal air movement, no respiratory distress  Cardiovascular: normal rate, normal S1 and S2 and no carotid bruits  Abdomen: soft, non-tender, non-distended, normal bowel sounds, no masses or organomegaly  Extremities: no cyanosis, no clubbing and no edema  Neurologic: no cranial nerve deficit and speech normal    LABS:  Recent Labs     01/02/21  1459      K 4.3      CO2 27   BUN 20   CREATININE 1.0   GLUCOSE 107*   CALCIUM 9.7       Recent Labs     01/02/21  1459   WBC 4.6   RBC 4.46   HGB 13.1   HCT 41.3   MCV 92.6   MCH 29.4   MCHC 31.7*   RDW 13.8      MPV 9.9     PT/INR:    Lab Results   Component Value Date    PROTIME 10.0 01/20/2015    INR 1.0 01/20/2015     Troponin:    Lab Results   Component Value Date    TROPONINI 0.02 01/02/2021     U/A:    Lab Results   Component Value Date    COLORU Yellow 10/16/2015    PROTEINU Negative 10/16/2015    PHUR 7.0 10/16/2015    WBCUA 1-3 07/07/2012    WBCUA 0-1 01/28/2012    RBCUA 1-3 07/07/2012    BACTERIA RARE 07/07/2012    CLARITYU Clear 10/16/2015    SPECGRAV 1.015 10/16/2015    LEUKOCYTESUR Negative 10/16/2015    UROBILINOGEN 0.2 10/16/2015    BILIRUBINUR Negative 10/16/2015    BILIRUBINUR NEGATIVE 01/28/2012    BLOODU Negative 10/16/2015    GLUCOSEU Negative 10/16/2015    GLUCOSEU NEGATIVE 01/28/2012       Radiology:   XR CHEST (2 VW)   Final Result   No pneumonia or pleural effusion. EKG:   Normal sinus rhythm  Left axis deviation  Left bundle branch block  Abnormal ECG  No previous ECGs available  Confirmed by Luciana Talbot ([id number]) on 7/21/2020 1:06:31 PM    ASSESSMENT:      Active Problems:    Acute respiratory failure due to COVID-19 ([facility] [address]    COVID-19    Hypothyroidism    HTN    Neuralgia    Resolved Problems:    * No resolved hospital problems. *    PLAN:    1. COVID 19 infection - Routine orders and labs. IV Remdesivir and Decadron. IV Hydration. Antiemetics. Trend labs and treat accordingly  2. Acute Hypoxic Respiratory Failure - Oxygen as needed. MDIs. Flutter valve  3. Hypothyroidism - continue meds  4. HTN - continue meds. Trend and adjust accordingly  5. Neuralgia - continue meds. Supportive care.     Code Status: full  DVT prophylaxis: lovenox    Electronically signed by Ifrah Benz MD on 1/2/2021 at 6:27 PM

## 2021-01-02 NOTE — ED NOTES
Prior to ambulation pt   HR 58   SPO2 94%    After ambulation of approx 25 ft pt  HR 68   SPO2 85%     Andrae Carver RN  01/02/21 [id number]

## 2021-01-02 NOTE — Clinical Note
Patient Class: Inpatient [101]   REQUIRED: Diagnosis: Acute respiratory failure due to COVID-19 [facility]) [[id number]]   Estimated Length of Stay: Estimated stay of more than 2 midnights   Admitting Provider: Lenard Singh [[id number]]   Telemetry Bed Required?: No

## 2021-01-02 NOTE — ED PROVIDER NOTES
FIRST PROVIDER CONTACT ASSESSMENT NOTE      Department of Emergency Medicine   ED  First Provider Note   1/2/21  2:07 PM EST    Chief Complaint: Shortness of Breath (SOB \"in mornings\", diarrhea) and Diarrhea      History of Present Illness:    Holley Purdy is a 80 y.o. female who presents to the ED by private car for weakness, diarrhea, cough and SOB. The patient is having intermittent right-sided jaw pain. She has a history of neuralgia. She is caring for her son at home with metastatic prostate cancer. Hospice nurses are in and out of the house all the time. The patient is having severe diarrhea shortness of breath and a mild cough. The family is concerned about Covid. The patient has not eaten or drank anything for several days. She does have a headache as well        Focused Screening Exam:  Constitutional:  Alert, appears stated age and is in no distress.         *ALLERGIES*     Aspirin-acetaminophen-caffeine and Pcn [penicillins]     ED Triage Vitals [01/02/21 1404]   BP Temp Temp Source Pulse Resp SpO2 Height Weight   139/63 97.1 °F (36.2 °C) Temporal 58 18 95 % 5' 1\" (1.549 m) 179 lb (81.2 kg)        Initial Plan of Care:  Initiate Treatment-Testing, Proceed toTreatment Area When Bed Available for ED Attending/MLP to Continue Care    -----------------END OF FIRST PROVIDER CONTACT ASSESSMENT NOTE--------------  Electronically signed by Naye Ta PA-C   DD: 1/2/21       Naye Ta PA-C  01/02/21 [id number]

## 2021-01-03 LAB
ADENOVIRUS BY PCR: NOT DETECTED
ALBUMIN SERPL-MCNC: 3.7 G/DL (ref 3.5–5.2)
ALP BLD-CCNC: 106 U/L (ref 35–104)
ALT SERPL-CCNC: 14 U/L (ref 0–32)
ANION GAP SERPL CALCULATED.3IONS-SCNC: 9 MMOL/L (ref 7–16)
AST SERPL-CCNC: 27 U/L (ref 0–31)
BACTERIA: ABNORMAL /HPF
BASOPHILS ABSOLUTE: 0 E9/L (ref 0–0.2)
BASOPHILS RELATIVE PERCENT: 0 % (ref 0–2)
BILIRUB SERPL-MCNC: 0.2 MG/DL (ref 0–1.2)
BILIRUBIN URINE: NEGATIVE
BLOOD, URINE: NEGATIVE
BORDETELLA PARAPERTUSSIS BY PCR: NOT DETECTED
BORDETELLA PERTUSSIS BY PCR: NOT DETECTED
BUN BLDV-MCNC: 15 MG/DL (ref 8–23)
C-REACTIVE PROTEIN: 0.2 MG/DL (ref 0–0.4)
CALCIUM SERPL-MCNC: 9 MG/DL (ref 8.6–10.2)
CHLAMYDOPHILIA PNEUMONIAE BY PCR: NOT DETECTED
CHLORIDE BLD-SCNC: 104 MMOL/L (ref 98–107)
CLARITY: CLEAR
CO2: 25 MMOL/L (ref 22–29)
COLOR: YELLOW
CORONAVIRUS 229E BY PCR: NOT DETECTED
CORONAVIRUS HKU1 BY PCR: NOT DETECTED
CORONAVIRUS NL63 BY PCR: NOT DETECTED
CORONAVIRUS OC43 BY PCR: NOT DETECTED
CREAT SERPL-MCNC: 0.8 MG/DL (ref 0.5–1)
D DIMER: <200 NG/ML DDU
EOSINOPHILS ABSOLUTE: 0 E9/L (ref 0.05–0.5)
EOSINOPHILS RELATIVE PERCENT: 0 % (ref 0–6)
EPITHELIAL CELLS, UA: ABNORMAL /HPF
FERRITIN: 45 NG/ML
FIBRINOGEN: 473 MG/DL (ref 225–540)
GFR AFRICAN AMERICAN: >60
GFR NON-AFRICAN AMERICAN: >60 ML/MIN/1.73
GLUCOSE BLD-MCNC: 127 MG/DL (ref 74–99)
GLUCOSE URINE: NEGATIVE MG/DL
HCT VFR BLD CALC: 38.8 % (ref 34–48)
HEMOGLOBIN: 12.5 G/DL (ref 11.5–15.5)
HUMAN METAPNEUMOVIRUS BY PCR: NOT DETECTED
HUMAN RHINOVIRUS/ENTEROVIRUS BY PCR: NOT DETECTED
IMMATURE GRANULOCYTES #: 0.01 E9/L
IMMATURE GRANULOCYTES %: 0.3 % (ref 0–5)
INFLUENZA A BY PCR: NOT DETECTED
INFLUENZA B BY PCR: NOT DETECTED
INR BLD: 0.9
KETONES, URINE: ABNORMAL MG/DL
LEUKOCYTE ESTERASE, URINE: ABNORMAL
LYMPHOCYTES ABSOLUTE: 0.74 E9/L (ref 1.5–4)
LYMPHOCYTES RELATIVE PERCENT: 24.7 % (ref 20–42)
MCH RBC QN AUTO: 29.2 PG (ref 26–35)
MCHC RBC AUTO-ENTMCNC: 32.2 % (ref 32–34.5)
MCV RBC AUTO: 90.7 FL (ref 80–99.9)
MONOCYTES ABSOLUTE: 0.14 E9/L (ref 0.1–0.95)
MONOCYTES RELATIVE PERCENT: 4.7 % (ref 2–12)
MYCOPLASMA PNEUMONIAE BY PCR: NOT DETECTED
NEUTROPHILS ABSOLUTE: 2.1 E9/L (ref 1.8–7.3)
NEUTROPHILS RELATIVE PERCENT: 70.3 % (ref 43–80)
NITRITE, URINE: NEGATIVE
PARAINFLUENZA VIRUS 1 BY PCR: NOT DETECTED
PARAINFLUENZA VIRUS 2 BY PCR: NOT DETECTED
PARAINFLUENZA VIRUS 3 BY PCR: NOT DETECTED
PARAINFLUENZA VIRUS 4 BY PCR: NOT DETECTED
PDW BLD-RTO: 13.5 FL (ref 11.5–15)
PH UA: 5 (ref 5–9)
PLATELET # BLD: 200 E9/L (ref 130–450)
PMV BLD AUTO: 10 FL (ref 7–12)
POTASSIUM REFLEX MAGNESIUM: 4.4 MMOL/L (ref 3.5–5)
PROCALCITONIN: 0.11 NG/ML (ref 0–0.08)
PROCALCITONIN: 0.12 NG/ML (ref 0–0.08)
PROTEIN UA: NEGATIVE MG/DL
PROTHROMBIN TIME: 10.7 SEC (ref 9.3–12.4)
RBC # BLD: 4.28 E12/L (ref 3.5–5.5)
RBC UA: ABNORMAL /HPF (ref 0–2)
RESPIRATORY SYNCYTIAL VIRUS BY PCR: NOT DETECTED
SARS-COV-2, PCR: DETECTED
SODIUM BLD-SCNC: 138 MMOL/L (ref 132–146)
SPECIFIC GRAVITY UA: 1.02 (ref 1–1.03)
TOTAL PROTEIN: 7.3 G/DL (ref 6.4–8.3)
UROBILINOGEN, URINE: 0.2 E.U./DL
VITAMIN D 25-HYDROXY: 14 NG/ML (ref 30–100)
WBC # BLD: 3 E9/L (ref 4.5–11.5)
WBC UA: ABNORMAL /HPF (ref 0–5)

## 2021-01-03 PROCEDURE — 2580000003 HC RX 258: Performed by: GENERAL PRACTICE

## 2021-01-03 PROCEDURE — 85025 COMPLETE CBC W/AUTO DIFF WBC: CPT

## 2021-01-03 PROCEDURE — 1200000000 HC SEMI PRIVATE

## 2021-01-03 PROCEDURE — 6370000000 HC RX 637 (ALT 250 FOR IP): Performed by: FAMILY MEDICINE

## 2021-01-03 PROCEDURE — 85610 PROTHROMBIN TIME: CPT

## 2021-01-03 PROCEDURE — 93005 ELECTROCARDIOGRAM TRACING: CPT | Performed by: INTERNAL MEDICINE

## 2021-01-03 PROCEDURE — 87070 CULTURE OTHR SPECIMN AEROBIC: CPT

## 2021-01-03 PROCEDURE — 2580000003 HC RX 258: Performed by: FAMILY MEDICINE

## 2021-01-03 PROCEDURE — 99233 SBSQ HOSP IP/OBS HIGH 50: CPT | Performed by: FAMILY MEDICINE

## 2021-01-03 PROCEDURE — 36415 COLL VENOUS BLD VENIPUNCTURE: CPT

## 2021-01-03 PROCEDURE — 85384 FIBRINOGEN ACTIVITY: CPT

## 2021-01-03 PROCEDURE — 84145 PROCALCITONIN (PCT): CPT

## 2021-01-03 PROCEDURE — 6370000000 HC RX 637 (ALT 250 FOR IP)

## 2021-01-03 PROCEDURE — 6360000002 HC RX W HCPCS: Performed by: FAMILY MEDICINE

## 2021-01-03 PROCEDURE — 87088 URINE BACTERIA CULTURE: CPT

## 2021-01-03 PROCEDURE — 87449 NOS EACH ORGANISM AG IA: CPT

## 2021-01-03 PROCEDURE — 81001 URINALYSIS AUTO W/SCOPE: CPT

## 2021-01-03 PROCEDURE — 85378 FIBRIN DEGRADE SEMIQUANT: CPT

## 2021-01-03 PROCEDURE — 87206 SMEAR FLUORESCENT/ACID STAI: CPT

## 2021-01-03 PROCEDURE — 2500000003 HC RX 250 WO HCPCS: Performed by: GENERAL PRACTICE

## 2021-01-03 PROCEDURE — 80053 COMPREHEN METABOLIC PANEL: CPT

## 2021-01-03 RX ORDER — ACYCLOVIR 200 MG/1
400 CAPSULE ORAL 3 TIMES DAILY
Status: DISCONTINUED | OUTPATIENT
Start: 2021-01-03 | End: 2021-01-07 | Stop reason: HOSPADM

## 2021-01-03 RX ORDER — PREGABALIN 25 MG/1
25 CAPSULE ORAL 3 TIMES DAILY
Status: DISCONTINUED | OUTPATIENT
Start: 2021-01-03 | End: 2021-01-07 | Stop reason: HOSPADM

## 2021-01-03 RX ORDER — GABAPENTIN 100 MG/1
100 CAPSULE ORAL NIGHTLY
Status: DISCONTINUED | OUTPATIENT
Start: 2021-01-03 | End: 2021-01-07 | Stop reason: HOSPADM

## 2021-01-03 RX ORDER — LEVOTHYROXINE SODIUM 0.03 MG/1
TABLET ORAL
Status: COMPLETED
Start: 2021-01-03 | End: 2021-01-03

## 2021-01-03 RX ORDER — LEVOTHYROXINE SODIUM 112 UG/1
TABLET ORAL
Status: COMPLETED
Start: 2021-01-03 | End: 2021-01-03

## 2021-01-03 RX ORDER — DEXTROSE, SODIUM CHLORIDE, SODIUM LACTATE, POTASSIUM CHLORIDE, AND CALCIUM CHLORIDE 5; .6; .31; .03; .02 G/100ML; G/100ML; G/100ML; G/100ML; G/100ML
INJECTION, SOLUTION INTRAVENOUS CONTINUOUS
Status: DISCONTINUED | OUTPATIENT
Start: 2021-01-03 | End: 2021-01-07 | Stop reason: HOSPADM

## 2021-01-03 RX ADMIN — LEVOTHYROXINE SODIUM: 25 TABLET ORAL at 06:28

## 2021-01-03 RX ADMIN — GUAIFENESIN AND DEXTROMETHORPHAN 5 ML: 100; 10 SYRUP ORAL at 09:00

## 2021-01-03 RX ADMIN — Medication 2000 UNITS: at 09:00

## 2021-01-03 RX ADMIN — PREGABALIN 25 MG: 25 CAPSULE ORAL at 16:47

## 2021-01-03 RX ADMIN — DEXAMETHASONE 6 MG: 4 TABLET ORAL at 09:00

## 2021-01-03 RX ADMIN — FAMOTIDINE 20 MG: 20 TABLET, FILM COATED ORAL at 22:01

## 2021-01-03 RX ADMIN — OXCARBAZEPINE 300 MG: 150 TABLET, FILM COATED ORAL at 10:32

## 2021-01-03 RX ADMIN — PREGABALIN 25 MG: 25 CAPSULE ORAL at 10:32

## 2021-01-03 RX ADMIN — ENOXAPARIN SODIUM 30 MG: 30 INJECTION SUBCUTANEOUS at 09:00

## 2021-01-03 RX ADMIN — SODIUM CHLORIDE, SODIUM LACTATE, POTASSIUM CHLORIDE, CALCIUM CHLORIDE AND DEXTROSE MONOHYDRATE: 5; 600; 310; 30; 20 INJECTION, SOLUTION INTRAVENOUS at 13:55

## 2021-01-03 RX ADMIN — PREGABALIN 25 MG: 25 CAPSULE ORAL at 22:00

## 2021-01-03 RX ADMIN — GUAIFENESIN AND DEXTROMETHORPHAN 5 ML: 100; 10 SYRUP ORAL at 22:01

## 2021-01-03 RX ADMIN — NEOMYCIN SULFATE, POLYMYXIN B SULFATE, HYDROCORTISONE 3 DROP: 3.5; 10000; 1 SOLUTION/ DROPS AURICULAR (OTIC) at 10:32

## 2021-01-03 RX ADMIN — METOPROLOL TARTRATE 25 MG: 25 TABLET, FILM COATED ORAL at 22:00

## 2021-01-03 RX ADMIN — DULOXETINE HYDROCHLORIDE 30 MG: 30 CAPSULE, DELAYED RELEASE ORAL at 09:00

## 2021-01-03 RX ADMIN — ACYCLOVIR 400 MG: 200 CAPSULE ORAL at 22:00

## 2021-01-03 RX ADMIN — FAMOTIDINE 20 MG: 20 TABLET, FILM COATED ORAL at 09:00

## 2021-01-03 RX ADMIN — LEVOTHYROXINE SODIUM: 0.11 TABLET ORAL at 06:28

## 2021-01-03 RX ADMIN — ENOXAPARIN SODIUM 30 MG: 30 INJECTION SUBCUTANEOUS at 22:00

## 2021-01-03 RX ADMIN — REMDESIVIR 100 MG: 100 INJECTION, POWDER, LYOPHILIZED, FOR SOLUTION INTRAVENOUS at 13:56

## 2021-01-03 RX ADMIN — OXYCODONE HYDROCHLORIDE AND ACETAMINOPHEN 1000 MG: 500 TABLET ORAL at 09:00

## 2021-01-03 RX ADMIN — LEVOTHYROXINE SODIUM 137 MCG: 25 TABLET ORAL at 06:27

## 2021-01-03 RX ADMIN — OXCARBAZEPINE 300 MG: 150 TABLET, FILM COATED ORAL at 22:01

## 2021-01-03 RX ADMIN — METOPROLOL TARTRATE 25 MG: 25 TABLET, FILM COATED ORAL at 09:00

## 2021-01-03 RX ADMIN — ACYCLOVIR 400 MG: 200 CAPSULE ORAL at 10:32

## 2021-01-03 RX ADMIN — Medication 10 ML: at 09:01

## 2021-01-03 RX ADMIN — NEOMYCIN SULFATE, POLYMYXIN B SULFATE, HYDROCORTISONE 3 DROP: 3.5; 10000; 1 SOLUTION/ DROPS AURICULAR (OTIC) at 16:47

## 2021-01-03 RX ADMIN — ACYCLOVIR 400 MG: 200 CAPSULE ORAL at 16:47

## 2021-01-03 RX ADMIN — ZINC SULFATE 220 MG (50 MG) CAPSULE 50 MG: CAPSULE at 09:00

## 2021-01-03 RX ADMIN — GABAPENTIN 100 MG: 100 CAPSULE ORAL at 22:00

## 2021-01-03 ASSESSMENT — PAIN SCALES - GENERAL: PAINLEVEL_OUTOF10: 0

## 2021-01-03 NOTE — PROGRESS NOTES
distress  Cardiovascular: normal rate, normal S1 and S2 and no carotid bruits  Abdomen: soft, non-tender, non-distended, normal bowel sounds, no masses or organomegaly  Extremities: no cyanosis, no clubbing and no edema  Neurologic: no cranial nerve deficit and speech normal    Recent Labs     01/02/21  1459 01/02/21 2115 01/03/21  0630    134 138   K 4.3 4.3 4.4    99 104   CO2 27 28 25   BUN 20 15 15   CREATININE 1.0 0.9 0.8   GLUCOSE 107* 122* 127*   CALCIUM 9.7 9.1 9.0       Recent Labs     01/02/21  1459 01/02/21 2115 01/03/21  0630   WBC 4.6 3.2* 3.0*   RBC 4.46 4.22 4.28   HGB 13.1 12.4 12.5   HCT 41.3 38.8 38.8   MCV 92.6 91.9 90.7   MCH 29.4 29.4 29.2   MCHC 31.7* 32.0 32.2   RDW 13.8 13.8 13.5    184 200   MPV 9.9 10.0 10.0     Radiology:  Xr Chest (2 Vw)    Result Date: 1/2/2021  EXAMINATION: TWO XRAY VIEWS OF THE CHEST 1/2/2021 2:52 pm COMPARISON: July 21, 2020 HISTORY: ORDERING SYSTEM PROVIDED HISTORY: Cough/SOB TECHNOLOGIST PROVIDED HISTORY: Reason for exam:->Cough/SOB FINDINGS: No airspace opacity or pleural effusion. The heart is normal size. No pneumothorax. No free air beneath the hemidiaphragms. No pneumonia or pleural effusion. Assessment:    Active Problems:    Acute respiratory failure due to COVID-19 ([facility] [address]    COVID-19    Hypothyroidism    HTN    Neuralgia    Resolved Problems:    * No resolved hospital problems. *    Plan:  1. COVID 19 infection - Routine orders and labs. IV Remdesivir and Decadron. IV Hydration. Antiemetics. Trend labs and treat accordingly  2. Acute Hypoxic Respiratory Failure - Oxygen as needed. MDIs. Flutter valve  3. Hypothyroidism - continue meds  4. HTN - continue meds. Trend and adjust accordingly  5. Neuralgia - continue meds. Add Otic suspension, Lyrica and Valtrex. D5LR as not eating.   Convert to saline lock when eating and drinking well.     Electronically signed by Benoit Perdue MD on 1/3/2021 at 9:26 AM

## 2021-01-04 LAB
ALBUMIN SERPL-MCNC: 3.4 G/DL (ref 3.5–5.2)
ALP BLD-CCNC: 91 U/L (ref 35–104)
ALT SERPL-CCNC: 19 U/L (ref 0–32)
ANION GAP SERPL CALCULATED.3IONS-SCNC: 7 MMOL/L (ref 7–16)
AST SERPL-CCNC: 34 U/L (ref 0–31)
BASOPHILS ABSOLUTE: 0.01 E9/L (ref 0–0.2)
BASOPHILS RELATIVE PERCENT: 0.2 % (ref 0–2)
BILIRUB SERPL-MCNC: 0.3 MG/DL (ref 0–1.2)
BUN BLDV-MCNC: 16 MG/DL (ref 8–23)
CALCIUM SERPL-MCNC: 8.6 MG/DL (ref 8.6–10.2)
CHLORIDE BLD-SCNC: 104 MMOL/L (ref 98–107)
CO2: 25 MMOL/L (ref 22–29)
CREAT SERPL-MCNC: 0.8 MG/DL (ref 0.5–1)
D DIMER: <200 NG/ML DDU
EKG ATRIAL RATE: 64 BPM
EKG P AXIS: 45 DEGREES
EKG P-R INTERVAL: 178 MS
EKG Q-T INTERVAL: 492 MS
EKG QRS DURATION: 138 MS
EKG QTC CALCULATION (BAZETT): 507 MS
EKG R AXIS: -24 DEGREES
EKG T AXIS: 91 DEGREES
EKG VENTRICULAR RATE: 64 BPM
EOSINOPHILS ABSOLUTE: 0.01 E9/L (ref 0.05–0.5)
EOSINOPHILS RELATIVE PERCENT: 0.2 % (ref 0–6)
FIBRINOGEN: 397 MG/DL (ref 225–540)
GFR AFRICAN AMERICAN: >60
GFR NON-AFRICAN AMERICAN: >60 ML/MIN/1.73
GLUCOSE BLD-MCNC: 106 MG/DL (ref 74–99)
HCT VFR BLD CALC: 38.2 % (ref 34–48)
HEMOGLOBIN: 12 G/DL (ref 11.5–15.5)
IMMATURE GRANULOCYTES #: 0.01 E9/L
IMMATURE GRANULOCYTES %: 0.2 % (ref 0–5)
INR BLD: 0.9
L. PNEUMOPHILA SEROGP 1 UR AG: NORMAL
LYMPHOCYTES ABSOLUTE: 0.9 E9/L (ref 1.5–4)
LYMPHOCYTES RELATIVE PERCENT: 20.3 % (ref 20–42)
MCH RBC QN AUTO: 28.9 PG (ref 26–35)
MCHC RBC AUTO-ENTMCNC: 31.4 % (ref 32–34.5)
MCV RBC AUTO: 92 FL (ref 80–99.9)
MONOCYTES ABSOLUTE: 0.27 E9/L (ref 0.1–0.95)
MONOCYTES RELATIVE PERCENT: 6.1 % (ref 2–12)
NEUTROPHILS ABSOLUTE: 3.23 E9/L (ref 1.8–7.3)
NEUTROPHILS RELATIVE PERCENT: 73 % (ref 43–80)
PDW BLD-RTO: 13.4 FL (ref 11.5–15)
PLATELET # BLD: 195 E9/L (ref 130–450)
PMV BLD AUTO: 10.3 FL (ref 7–12)
POTASSIUM REFLEX MAGNESIUM: 3.7 MMOL/L (ref 3.5–5)
PROTHROMBIN TIME: 10.6 SEC (ref 9.3–12.4)
RBC # BLD: 4.15 E12/L (ref 3.5–5.5)
SODIUM BLD-SCNC: 136 MMOL/L (ref 132–146)
STREP PNEUMONIAE ANTIGEN, URINE: NORMAL
TOTAL PROTEIN: 6.5 G/DL (ref 6.4–8.3)
WBC # BLD: 4.4 E9/L (ref 4.5–11.5)

## 2021-01-04 PROCEDURE — 2500000003 HC RX 250 WO HCPCS: Performed by: GENERAL PRACTICE

## 2021-01-04 PROCEDURE — 2580000003 HC RX 258: Performed by: FAMILY MEDICINE

## 2021-01-04 PROCEDURE — 80053 COMPREHEN METABOLIC PANEL: CPT

## 2021-01-04 PROCEDURE — 85384 FIBRINOGEN ACTIVITY: CPT

## 2021-01-04 PROCEDURE — 2580000003 HC RX 258: Performed by: GENERAL PRACTICE

## 2021-01-04 PROCEDURE — 85610 PROTHROMBIN TIME: CPT

## 2021-01-04 PROCEDURE — 36415 COLL VENOUS BLD VENIPUNCTURE: CPT

## 2021-01-04 PROCEDURE — 1200000000 HC SEMI PRIVATE

## 2021-01-04 PROCEDURE — 99232 SBSQ HOSP IP/OBS MODERATE 35: CPT | Performed by: INTERNAL MEDICINE

## 2021-01-04 PROCEDURE — 6360000002 HC RX W HCPCS: Performed by: FAMILY MEDICINE

## 2021-01-04 PROCEDURE — 85025 COMPLETE CBC W/AUTO DIFF WBC: CPT

## 2021-01-04 PROCEDURE — 6370000000 HC RX 637 (ALT 250 FOR IP): Performed by: FAMILY MEDICINE

## 2021-01-04 PROCEDURE — 85378 FIBRIN DEGRADE SEMIQUANT: CPT

## 2021-01-04 RX ADMIN — OXCARBAZEPINE 300 MG: 150 TABLET, FILM COATED ORAL at 21:04

## 2021-01-04 RX ADMIN — GABAPENTIN 100 MG: 100 CAPSULE ORAL at 21:04

## 2021-01-04 RX ADMIN — ACYCLOVIR 400 MG: 200 CAPSULE ORAL at 08:05

## 2021-01-04 RX ADMIN — REMDESIVIR 100 MG: 100 INJECTION, POWDER, LYOPHILIZED, FOR SOLUTION INTRAVENOUS at 14:08

## 2021-01-04 RX ADMIN — NEOMYCIN SULFATE, POLYMYXIN B SULFATE, HYDROCORTISONE 3 DROP: 3.5; 10000; 1 SOLUTION/ DROPS AURICULAR (OTIC) at 12:19

## 2021-01-04 RX ADMIN — ACYCLOVIR 400 MG: 200 CAPSULE ORAL at 21:04

## 2021-01-04 RX ADMIN — NEOMYCIN SULFATE, POLYMYXIN B SULFATE, HYDROCORTISONE 3 DROP: 3.5; 10000; 1 SOLUTION/ DROPS AURICULAR (OTIC) at 06:26

## 2021-01-04 RX ADMIN — ACYCLOVIR 400 MG: 200 CAPSULE ORAL at 14:08

## 2021-01-04 RX ADMIN — DEXAMETHASONE 6 MG: 4 TABLET ORAL at 08:04

## 2021-01-04 RX ADMIN — FAMOTIDINE 20 MG: 20 TABLET, FILM COATED ORAL at 21:04

## 2021-01-04 RX ADMIN — PREGABALIN 25 MG: 25 CAPSULE ORAL at 08:05

## 2021-01-04 RX ADMIN — PREGABALIN 25 MG: 25 CAPSULE ORAL at 21:04

## 2021-01-04 RX ADMIN — METOPROLOL TARTRATE 25 MG: 25 TABLET, FILM COATED ORAL at 21:04

## 2021-01-04 RX ADMIN — NEOMYCIN SULFATE, POLYMYXIN B SULFATE, HYDROCORTISONE 3 DROP: 3.5; 10000; 1 SOLUTION/ DROPS AURICULAR (OTIC) at 18:29

## 2021-01-04 RX ADMIN — OXYCODONE HYDROCHLORIDE AND ACETAMINOPHEN 1000 MG: 500 TABLET ORAL at 08:05

## 2021-01-04 RX ADMIN — Medication 10 ML: at 08:04

## 2021-01-04 RX ADMIN — METOPROLOL TARTRATE 25 MG: 25 TABLET, FILM COATED ORAL at 08:04

## 2021-01-04 RX ADMIN — Medication 2000 UNITS: at 08:04

## 2021-01-04 RX ADMIN — OXCARBAZEPINE 300 MG: 150 TABLET, FILM COATED ORAL at 08:04

## 2021-01-04 RX ADMIN — FAMOTIDINE 20 MG: 20 TABLET, FILM COATED ORAL at 08:04

## 2021-01-04 RX ADMIN — ENOXAPARIN SODIUM 30 MG: 30 INJECTION SUBCUTANEOUS at 21:04

## 2021-01-04 RX ADMIN — NEOMYCIN SULFATE, POLYMYXIN B SULFATE, HYDROCORTISONE 3 DROP: 3.5; 10000; 1 SOLUTION/ DROPS AURICULAR (OTIC) at 00:32

## 2021-01-04 RX ADMIN — PREGABALIN 25 MG: 25 CAPSULE ORAL at 14:08

## 2021-01-04 RX ADMIN — LEVOTHYROXINE SODIUM 137 MCG: 25 TABLET ORAL at 06:26

## 2021-01-04 RX ADMIN — ZINC SULFATE 220 MG (50 MG) CAPSULE 50 MG: CAPSULE at 08:04

## 2021-01-04 RX ADMIN — DULOXETINE HYDROCHLORIDE 30 MG: 30 CAPSULE, DELAYED RELEASE ORAL at 08:04

## 2021-01-04 RX ADMIN — ENOXAPARIN SODIUM 30 MG: 30 INJECTION SUBCUTANEOUS at 08:04

## 2021-01-04 ASSESSMENT — PAIN SCALES - GENERAL
PAINLEVEL_OUTOF10: 0
PAINLEVEL_OUTOF10: 0

## 2021-01-04 NOTE — PROGRESS NOTES
Patient continues to bend arm while sleeping causing her iv to beep. I informed her that I will look at the site and see if we can adjust or change it. Patient did not want that at this time. Stated she wants to get some sleep without the beeping. Patient refusing fluids at this time and said she will continue them in the morning when she gets up. I informed the patient the importance of iv fluid, patient still wanted them off.

## 2021-01-04 NOTE — CARE COORDINATION
Social Work / Discharge Planning : COVID POSITIVE : Remdesivir day # 2: SW called patient and explained role as discharge planner/ transition of care. Patient verified plan at discharge is HOME where she takes care of her adult blind children. Her one child currently has Hospice. Patient could not name agency. Patient stated her family is taking care of her children while she is here. They will transport her home. Patient states she does NOT use a device and still drives. She states she hasn't left the house  for four months. DR Michaelle Green is her PCP. Patient denies hx of HHC/SNF. Patient currently on RA. Patient denies any home needs. SW to follow.  Electronically signed by ANNIKA Pompa on 1/4/21 at 10:15 AM EST

## 2021-01-04 NOTE — PROGRESS NOTES
Department of Internal Medicine  General Internal Medicine  Attending Progress Note      SUBJECTIVE:  Reports that she is having [address] states tomorrow she described it as being sick to stomach she stated that she had diarrhea 2-3 times a day. Last episode was today. .  Noted diarrhea today. She denied any abdominal pain. She noted that she feels nauseated but has not had any vomiting.     OBJECTIVE      Medications    Current Facility-Administered Medications: neomycin-polymyxin-hydrocortisone (CORTISPORIN) otic solution 3 drop, 3 drop, Both Ears, 4 times per day  pregabalin (LYRICA) capsule 25 mg, 25 mg, Oral, TID  acyclovir (ZOVIRAX) capsule 400 mg, 400 mg, Oral, TID  dextrose 5 % in lactated ringers infusion, , Intravenous, Continuous  gabapentin (NEURONTIN) capsule 100 mg, 100 mg, Oral, Nightly  [COMPLETED] remdesivir 200 mg in sodium chloride 0.9 % 250 mL IVPB, 200 mg, Intravenous, Once **FOLLOWED BY** remdesivir 100 mg in sodium chloride 0.9 % 250 mL IVPB, 100 mg, Intravenous, Q24H  0.9 % sodium chloride bolus, 30 mL, Intravenous, PRN  levothyroxine (SYNTHROID) tablet 137 mcg, 137 mcg, Oral, Daily  metoprolol tartrate (LOPRESSOR) tablet 25 mg, 25 mg, Oral, BID  OXcarbazepine (TRILEPTAL) tablet 300 mg, 300 mg, Oral, BID  DULoxetine (CYMBALTA) extended release capsule 30 mg, 30 mg, Oral, Daily  sodium chloride flush 0.9 % injection 10 mL, 10 mL, Intravenous, 2 times per day  sodium chloride flush 0.9 % injection 10 mL, 10 mL, Intravenous, PRN  enoxaparin (LOVENOX) injection 30 mg, 30 mg, Subcutaneous, BID  polyethylene glycol (GLYCOLAX) packet 17 g, 17 g, Oral, Daily PRN  acetaminophen (TYLENOL) tablet 650 mg, 650 mg, Oral, Q6H PRN **OR** acetaminophen (TYLENOL) suppository 650 mg, 650 mg, Rectal, Q6H PRN  famotidine (PEPCID) tablet 20 mg, 20 mg, Oral, BID  guaiFENesin-dextromethorphan (ROBITUSSIN DM) 100-10 MG/5ML syrup 5 mL, 5 mL, Oral, Q4H PRN  dexamethasone (DECADRON) tablet 6 mg, 6 mg, Oral, Daily  Vitamin D (CHOLECALCIFEROL) tablet 2,000 Units, 2,000 Units, Oral, Daily  zinc sulfate (ZINCATE) capsule 50 mg, 50 mg, Oral, Daily  ascorbic acid (VITAMIN C) tablet 1,000 mg, 1,000 mg, Oral, Daily  trimethobenzamide (TIGAN) injection 200 mg, 200 mg, Intramuscular, Q6H PRN  Physical    VITALS:  BP (!) 114/55   Pulse 52   Temp 98.7 °F (37.1 °C) (Oral)   Resp 20   Ht 5' 1\" (1.549 m)   Wt 188 lb 3.2 oz (85.4 kg)   SpO2 92%   BMI 35.56 kg/m²   CONSTITUTIONAL:  awake, alert, cooperative, no apparent distress, and appears stated age  EYES:  Lids and lashes normal, pupils equal, round and reactive to light, extra ocular muscles intact, sclera clear, conjunctiva normal  ENT:  normocepalic, without obvious abnormality  NECK:  supple, symmetrical, trachea midline  HEMATOLOGIC/LYMPHATICS:  no cervical lymphadenopathy  BACK:  symmetric  LUNGS:  No increased work of breathing, good air exchange, clear to auscultation bilaterally, no crackles or wheezing  CARDIOVASCULAR:  normal apical pulses  ABDOMEN:  No scars, normal bowel sounds, soft, non-distended, non-tender, no masses palpated, no hepatosplenomegally  MUSCULOSKELETAL:  there is no redness, warmth, or swelling of the joints  SKIN:  no bruising or bleeding  Data    CBC:   Lab Results   Component Value Date    WBC 3.0 01/03/2021    RBC 4.28 01/03/2021    HGB 12.5 01/03/2021    HCT 38.8 01/03/2021    MCV 90.7 01/03/2021    MCH 29.2 01/03/2021    MCHC 32.2 01/03/2021    RDW 13.5 01/03/2021     01/03/2021    MPV 10.0 01/03/2021     BMP:    Lab Results   Component Value Date     01/03/2021    K 4.4 01/03/2021     01/03/2021    CO2 25 01/03/2021    BUN 15 01/03/2021    LABALBU 3.7 01/03/2021    LABALBU 4.4 01/28/2012    CREATININE 0.8 01/03/2021    CALCIUM 9.0 01/03/2021    GFRAA >60 01/03/2021    LABGLOM >60 01/03/2021    GLUCOSE 127 01/03/2021    GLUCOSE 122 01/28/2012       ASSESSMENT AND PLAN      1.   Acute respiratory failure due to COVID-19:  Continue supplemental oxygen. Wean off as tolerated. Continue Decadron 6 mg daily for 10 days. Encourage out of bed ambulation. 2.  COVID-19.    3.  Hypothyroidism. Continue Synthroid. 4.  Hypertension essential:  Continue current medication. 5.  Depression/anxiety:  Continue Cymbalta. 6.  Diarrhea:  Suspect that this might be due to Covid however will get stool test to evaluate for C. difficile. If C. difficile negative, I will give antidiarrhea medication.

## 2021-01-05 LAB
ALBUMIN SERPL-MCNC: 3.6 G/DL (ref 3.5–5.2)
ALP BLD-CCNC: 94 U/L (ref 35–104)
ALT SERPL-CCNC: 16 U/L (ref 0–32)
ANION GAP SERPL CALCULATED.3IONS-SCNC: 7 MMOL/L (ref 7–16)
AST SERPL-CCNC: 28 U/L (ref 0–31)
BASOPHILS ABSOLUTE: 0.01 E9/L (ref 0–0.2)
BASOPHILS RELATIVE PERCENT: 0.3 % (ref 0–2)
BILIRUB SERPL-MCNC: 0.2 MG/DL (ref 0–1.2)
BUN BLDV-MCNC: 15 MG/DL (ref 8–23)
CALCIUM SERPL-MCNC: 9 MG/DL (ref 8.6–10.2)
CHLORIDE BLD-SCNC: 103 MMOL/L (ref 98–107)
CO2: 26 MMOL/L (ref 22–29)
CREAT SERPL-MCNC: 0.8 MG/DL (ref 0.5–1)
CULTURE, RESPIRATORY: NORMAL
D DIMER: 228 NG/ML DDU
EOSINOPHILS ABSOLUTE: 0.03 E9/L (ref 0.05–0.5)
EOSINOPHILS RELATIVE PERCENT: 0.8 % (ref 0–6)
FIBRINOGEN: 385 MG/DL (ref 225–540)
GFR AFRICAN AMERICAN: >60
GFR NON-AFRICAN AMERICAN: >60 ML/MIN/1.73
GLUCOSE BLD-MCNC: 89 MG/DL (ref 74–99)
HCT VFR BLD CALC: 38.7 % (ref 34–48)
HEMOGLOBIN: 12.5 G/DL (ref 11.5–15.5)
IMMATURE GRANULOCYTES #: 0.01 E9/L
IMMATURE GRANULOCYTES %: 0.3 % (ref 0–5)
INR BLD: 0.9
LYMPHOCYTES ABSOLUTE: 2.05 E9/L (ref 1.5–4)
LYMPHOCYTES RELATIVE PERCENT: 51.6 % (ref 20–42)
MCH RBC QN AUTO: 29.2 PG (ref 26–35)
MCHC RBC AUTO-ENTMCNC: 32.3 % (ref 32–34.5)
MCV RBC AUTO: 90.4 FL (ref 80–99.9)
MONOCYTES ABSOLUTE: 0.31 E9/L (ref 0.1–0.95)
MONOCYTES RELATIVE PERCENT: 7.8 % (ref 2–12)
NEUTROPHILS ABSOLUTE: 1.56 E9/L (ref 1.8–7.3)
NEUTROPHILS RELATIVE PERCENT: 39.2 % (ref 43–80)
PDW BLD-RTO: 13.4 FL (ref 11.5–15)
PLATELET # BLD: 228 E9/L (ref 130–450)
PMV BLD AUTO: 10.3 FL (ref 7–12)
POTASSIUM REFLEX MAGNESIUM: 3.9 MMOL/L (ref 3.5–5)
PROTHROMBIN TIME: 11 SEC (ref 9.3–12.4)
RBC # BLD: 4.28 E12/L (ref 3.5–5.5)
SMEAR, RESPIRATORY: NORMAL
SODIUM BLD-SCNC: 136 MMOL/L (ref 132–146)
TOTAL PROTEIN: 6.5 G/DL (ref 6.4–8.3)
URINE CULTURE, ROUTINE: NORMAL
WBC # BLD: 4 E9/L (ref 4.5–11.5)

## 2021-01-05 PROCEDURE — 85610 PROTHROMBIN TIME: CPT

## 2021-01-05 PROCEDURE — 36415 COLL VENOUS BLD VENIPUNCTURE: CPT

## 2021-01-05 PROCEDURE — 85378 FIBRIN DEGRADE SEMIQUANT: CPT

## 2021-01-05 PROCEDURE — 6370000000 HC RX 637 (ALT 250 FOR IP): Performed by: FAMILY MEDICINE

## 2021-01-05 PROCEDURE — 6360000002 HC RX W HCPCS: Performed by: FAMILY MEDICINE

## 2021-01-05 PROCEDURE — 85384 FIBRINOGEN ACTIVITY: CPT

## 2021-01-05 PROCEDURE — 80053 COMPREHEN METABOLIC PANEL: CPT

## 2021-01-05 PROCEDURE — 2580000003 HC RX 258: Performed by: GENERAL PRACTICE

## 2021-01-05 PROCEDURE — 85025 COMPLETE CBC W/AUTO DIFF WBC: CPT

## 2021-01-05 PROCEDURE — 2580000003 HC RX 258: Performed by: FAMILY MEDICINE

## 2021-01-05 PROCEDURE — 99232 SBSQ HOSP IP/OBS MODERATE 35: CPT | Performed by: INTERNAL MEDICINE

## 2021-01-05 PROCEDURE — 1200000000 HC SEMI PRIVATE

## 2021-01-05 PROCEDURE — 2500000003 HC RX 250 WO HCPCS: Performed by: GENERAL PRACTICE

## 2021-01-05 RX ADMIN — SODIUM CHLORIDE, SODIUM LACTATE, POTASSIUM CHLORIDE, CALCIUM CHLORIDE AND DEXTROSE MONOHYDRATE: 5; 600; 310; 30; 20 INJECTION, SOLUTION INTRAVENOUS at 05:02

## 2021-01-05 RX ADMIN — Medication 2000 UNITS: at 08:15

## 2021-01-05 RX ADMIN — ACETAMINOPHEN 650 MG: 325 TABLET ORAL at 05:03

## 2021-01-05 RX ADMIN — PREGABALIN 25 MG: 25 CAPSULE ORAL at 23:31

## 2021-01-05 RX ADMIN — ENOXAPARIN SODIUM 30 MG: 30 INJECTION SUBCUTANEOUS at 23:30

## 2021-01-05 RX ADMIN — NEOMYCIN SULFATE, POLYMYXIN B SULFATE, HYDROCORTISONE 3 DROP: 3.5; 10000; 1 SOLUTION/ DROPS AURICULAR (OTIC) at 23:32

## 2021-01-05 RX ADMIN — GUAIFENESIN AND DEXTROMETHORPHAN 5 ML: 100; 10 SYRUP ORAL at 08:14

## 2021-01-05 RX ADMIN — DULOXETINE HYDROCHLORIDE 30 MG: 30 CAPSULE, DELAYED RELEASE ORAL at 08:15

## 2021-01-05 RX ADMIN — OXCARBAZEPINE 300 MG: 150 TABLET, FILM COATED ORAL at 23:31

## 2021-01-05 RX ADMIN — REMDESIVIR 100 MG: 100 INJECTION, POWDER, LYOPHILIZED, FOR SOLUTION INTRAVENOUS at 15:08

## 2021-01-05 RX ADMIN — ZINC SULFATE 220 MG (50 MG) CAPSULE 50 MG: CAPSULE at 08:15

## 2021-01-05 RX ADMIN — PREGABALIN 25 MG: 25 CAPSULE ORAL at 15:08

## 2021-01-05 RX ADMIN — OXYCODONE HYDROCHLORIDE AND ACETAMINOPHEN 1000 MG: 500 TABLET ORAL at 08:14

## 2021-01-05 RX ADMIN — METOPROLOL TARTRATE 25 MG: 25 TABLET, FILM COATED ORAL at 23:30

## 2021-01-05 RX ADMIN — ENOXAPARIN SODIUM 30 MG: 30 INJECTION SUBCUTANEOUS at 08:15

## 2021-01-05 RX ADMIN — FAMOTIDINE 20 MG: 20 TABLET, FILM COATED ORAL at 23:31

## 2021-01-05 RX ADMIN — NEOMYCIN SULFATE, POLYMYXIN B SULFATE, HYDROCORTISONE 3 DROP: 3.5; 10000; 1 SOLUTION/ DROPS AURICULAR (OTIC) at 12:24

## 2021-01-05 RX ADMIN — Medication 10 ML: at 08:17

## 2021-01-05 RX ADMIN — ACYCLOVIR 400 MG: 200 CAPSULE ORAL at 23:31

## 2021-01-05 RX ADMIN — PREGABALIN 25 MG: 25 CAPSULE ORAL at 08:15

## 2021-01-05 RX ADMIN — FAMOTIDINE 20 MG: 20 TABLET, FILM COATED ORAL at 08:15

## 2021-01-05 RX ADMIN — LEVOTHYROXINE SODIUM 137 MCG: 25 TABLET ORAL at 05:02

## 2021-01-05 RX ADMIN — OXCARBAZEPINE 300 MG: 150 TABLET, FILM COATED ORAL at 08:14

## 2021-01-05 RX ADMIN — NEOMYCIN SULFATE, POLYMYXIN B SULFATE, HYDROCORTISONE 3 DROP: 3.5; 10000; 1 SOLUTION/ DROPS AURICULAR (OTIC) at 05:02

## 2021-01-05 RX ADMIN — GUAIFENESIN AND DEXTROMETHORPHAN 5 ML: 100; 10 SYRUP ORAL at 15:09

## 2021-01-05 RX ADMIN — ACYCLOVIR 400 MG: 200 CAPSULE ORAL at 15:08

## 2021-01-05 RX ADMIN — ACYCLOVIR 400 MG: 200 CAPSULE ORAL at 08:15

## 2021-01-05 RX ADMIN — GABAPENTIN 100 MG: 100 CAPSULE ORAL at 23:31

## 2021-01-05 RX ADMIN — METOPROLOL TARTRATE 25 MG: 25 TABLET, FILM COATED ORAL at 08:14

## 2021-01-05 RX ADMIN — SODIUM CHLORIDE, SODIUM LACTATE, POTASSIUM CHLORIDE, CALCIUM CHLORIDE AND DEXTROSE MONOHYDRATE: 5; 600; 310; 30; 20 INJECTION, SOLUTION INTRAVENOUS at 23:30

## 2021-01-05 RX ADMIN — DEXAMETHASONE 6 MG: 4 TABLET ORAL at 08:14

## 2021-01-05 ASSESSMENT — PAIN SCALES - GENERAL
PAINLEVEL_OUTOF10: 0
PAINLEVEL_OUTOF10: 0

## 2021-01-05 NOTE — PROGRESS NOTES
Not valid  -- Disagree - Clinically unable to determine / Unknown  -- Refer to Clinical Documentation Reviewer    PROVIDER RESPONSE TEXT:    This patient is in acute respiratory failure as evidenced by decreased oxygen   saturation at 80%    Query created by: Gene Carpio on 1/5/2021 12:27 PM      Electronically signed by:  Silas Quintana MD 1/5/2021 5:43 PM

## 2021-01-05 NOTE — PROGRESS NOTES
[facility] Progress Note    Admitting Date and Time: 1/2/2021  2:52 PM  Admit Dx: Acute respiratory failure due to COVID-19 ([facility] [address] [U07.1, J96.00]  COVID-19 [U07.1]    Subjective:  Patient is being followed for Acute respiratory failure due to COVID-19 ([facility] [address] [U07.1, J96.00]  COVID-19 [U07.1]     Patient awake, alert, resting in bed in no acute distress  Reporting diarrhea is still \" bad\"  1 episode this am  Reporting 4 episodes yesterday  Appetite fair- c/o chronic jaw pain that makes eating difficult  Reporting feeling dizzy yesterday- denies today  Currently on RA          ROS: denies fever, chills, cp, sob, n/v, HA unless stated above.       neomycin-polymyxin-hydrocortisone  3 drop Both Ears 4 times per day    pregabalin  25 mg Oral TID    acyclovir  400 mg Oral TID    gabapentin  100 mg Oral Nightly    remdesivir IVPB  100 mg Intravenous Q24H    levothyroxine  137 mcg Oral Daily    metoprolol tartrate  25 mg Oral BID    OXcarbazepine  300 mg Oral BID    DULoxetine  30 mg Oral Daily    sodium chloride flush  10 mL Intravenous 2 times per day    enoxaparin  30 mg Subcutaneous BID    famotidine  20 mg Oral BID    dexamethasone  6 mg Oral Daily    Vitamin D  2,000 Units Oral Daily    zinc sulfate  50 mg Oral Daily    ascorbic acid  1,000 mg Oral Daily         sodium chloride, 30 mL, PRN      sodium chloride flush, 10 mL, PRN      polyethylene glycol, 17 g, Daily PRN      acetaminophen, 650 mg, Q6H PRN    Or      acetaminophen, 650 mg, Q6H PRN      guaiFENesin-dextromethorphan, 5 mL, Q4H PRN      trimethobenzamide, 200 mg, Q6H PRN         Objective:    /61   Pulse 52   Temp 97.9 °F (36.6 °C) (Oral)   Resp 20   Ht 5' 1\" (1.549 m)   Wt 188 lb 3.2 oz (85.4 kg)   SpO2 94%   BMI 35.56 kg/m²   General Appearance: alert and oriented to person, place and time and in no acute distress  Skin: warm and dry  Head: normocephalic and atraumatic  Neck: neck supple and non tender without mass   Pulmonary/Chest: clear to auscultation bilaterally-  Cardiovascular: normal rate, normal S1 and S2 and no carotid bruits  Abdomen: soft, non-tender, non-distended, normal bowel sounds, no masses or organomegaly  Extremities: no cyanosis, no clubbing and no edema  Neurologic: speech normal       Recent Labs     01/03/21  0630 01/04/21  1140 01/05/21  0525    136 136   K 4.4 3.7 3.9    104 103   CO2 25 25 26   BUN 15 16 15   CREATININE 0.8 0.8 0.8   GLUCOSE 127* 106* 89   CALCIUM 9.0 8.6 9.0       Recent Labs     01/03/21  0630 01/04/21  1140 01/05/21  0525   WBC 3.0* 4.4* 4.0*   RBC 4.28 4.15 4.28   HGB 12.5 12.0 12.5   HCT 38.8 38.2 38.7   MCV 90.7 92.0 90.4   MCH 29.2 28.9 29.2   MCHC 32.2 31.4* 32.3   RDW 13.5 13.4 13.4    195 228   MPV 10.0 10.3 10.3         Assessment:    Active Problems:    Acute respiratory failure due to COVID-19 ([facility] [address]    COVID-19  Resolved Problems:    * No resolved hospital problems. *      Plan:  1. Acute respiratory failure with hypoxia on exertion: pt presented to the ER with complaints of fatigue and diarrhea. 95% on RA on arrival to ER. Noted in ER that patient desaturated to 85% on RA with ambulating. Currently on RA. Check pulse ox ambulating. 2. COVID 19 infection: CXR reviewed. Pharmacy consulted for IV remdesivir. Day #4. Continue decadron. Lovenox. SMI. Vitamin supplementation. Follow inflammatory markers. 3. Diarrhea: may be related to COVID 19. Check stool for c-diff. Per nursing have been unable to send as pt has been incontinent- will attempt to send today. On IVF. Check orthostatic vitals. 4. HTN; continue meds    5. Hypothyroid disease; synthroid    6. Depression/ anxiety: cymbalta    7. Deconditioning: check PT/OT      NOTE: This report was transcribed using voice recognition software. Every effort was made to ensure accuracy; however, inadvertent computerized transcription errors may be present.   Electronically signed by LESA Cannon on 1/5/2021 at 10:55 AM

## 2021-01-06 LAB
ALBUMIN SERPL-MCNC: 3.3 G/DL (ref 3.5–5.2)
ALP BLD-CCNC: 79 U/L (ref 35–104)
ALT SERPL-CCNC: 15 U/L (ref 0–32)
ANION GAP SERPL CALCULATED.3IONS-SCNC: 5 MMOL/L (ref 7–16)
AST SERPL-CCNC: 23 U/L (ref 0–31)
BASOPHILS ABSOLUTE: 0.01 E9/L (ref 0–0.2)
BASOPHILS RELATIVE PERCENT: 0.3 % (ref 0–2)
BILIRUB SERPL-MCNC: <0.2 MG/DL (ref 0–1.2)
BUN BLDV-MCNC: 14 MG/DL (ref 8–23)
CALCIUM SERPL-MCNC: 8.8 MG/DL (ref 8.6–10.2)
CHLORIDE BLD-SCNC: 104 MMOL/L (ref 98–107)
CO2: 29 MMOL/L (ref 22–29)
CREAT SERPL-MCNC: 0.8 MG/DL (ref 0.5–1)
D DIMER: <200 NG/ML DDU
EOSINOPHILS ABSOLUTE: 0.02 E9/L (ref 0.05–0.5)
EOSINOPHILS RELATIVE PERCENT: 0.6 % (ref 0–6)
FIBRINOGEN: 344 MG/DL (ref 225–540)
GFR AFRICAN AMERICAN: >60
GFR NON-AFRICAN AMERICAN: >60 ML/MIN/1.73
GLUCOSE BLD-MCNC: 109 MG/DL (ref 74–99)
HCT VFR BLD CALC: 35.6 % (ref 34–48)
HEMOGLOBIN: 11.6 G/DL (ref 11.5–15.5)
IMMATURE GRANULOCYTES #: 0.02 E9/L
IMMATURE GRANULOCYTES %: 0.6 % (ref 0–5)
INR BLD: 0.9
LYMPHOCYTES ABSOLUTE: 1.41 E9/L (ref 1.5–4)
LYMPHOCYTES RELATIVE PERCENT: 40.3 % (ref 20–42)
MCH RBC QN AUTO: 29.4 PG (ref 26–35)
MCHC RBC AUTO-ENTMCNC: 32.6 % (ref 32–34.5)
MCV RBC AUTO: 90.1 FL (ref 80–99.9)
MONOCYTES ABSOLUTE: 0.33 E9/L (ref 0.1–0.95)
MONOCYTES RELATIVE PERCENT: 9.4 % (ref 2–12)
NEUTROPHILS ABSOLUTE: 1.71 E9/L (ref 1.8–7.3)
NEUTROPHILS RELATIVE PERCENT: 48.8 % (ref 43–80)
PDW BLD-RTO: 13.2 FL (ref 11.5–15)
PLATELET # BLD: 172 E9/L (ref 130–450)
PMV BLD AUTO: 9.9 FL (ref 7–12)
POTASSIUM REFLEX MAGNESIUM: 3.6 MMOL/L (ref 3.5–5)
PROTHROMBIN TIME: 10.5 SEC (ref 9.3–12.4)
RBC # BLD: 3.95 E12/L (ref 3.5–5.5)
SODIUM BLD-SCNC: 138 MMOL/L (ref 132–146)
TOTAL PROTEIN: 5.7 G/DL (ref 6.4–8.3)
WBC # BLD: 3.5 E9/L (ref 4.5–11.5)

## 2021-01-06 PROCEDURE — 80053 COMPREHEN METABOLIC PANEL: CPT

## 2021-01-06 PROCEDURE — 85610 PROTHROMBIN TIME: CPT

## 2021-01-06 PROCEDURE — 99232 SBSQ HOSP IP/OBS MODERATE 35: CPT | Performed by: STUDENT IN AN ORGANIZED HEALTH CARE EDUCATION/TRAINING PROGRAM

## 2021-01-06 PROCEDURE — 1200000000 HC SEMI PRIVATE

## 2021-01-06 PROCEDURE — 2580000003 HC RX 258: Performed by: GENERAL PRACTICE

## 2021-01-06 PROCEDURE — 2580000003 HC RX 258: Performed by: FAMILY MEDICINE

## 2021-01-06 PROCEDURE — 6370000000 HC RX 637 (ALT 250 FOR IP): Performed by: FAMILY MEDICINE

## 2021-01-06 PROCEDURE — 85025 COMPLETE CBC W/AUTO DIFF WBC: CPT

## 2021-01-06 PROCEDURE — 36415 COLL VENOUS BLD VENIPUNCTURE: CPT

## 2021-01-06 PROCEDURE — 85378 FIBRIN DEGRADE SEMIQUANT: CPT

## 2021-01-06 PROCEDURE — 85384 FIBRINOGEN ACTIVITY: CPT

## 2021-01-06 PROCEDURE — 6360000002 HC RX W HCPCS: Performed by: FAMILY MEDICINE

## 2021-01-06 PROCEDURE — 2500000003 HC RX 250 WO HCPCS: Performed by: GENERAL PRACTICE

## 2021-01-06 RX ADMIN — PREGABALIN 25 MG: 25 CAPSULE ORAL at 13:42

## 2021-01-06 RX ADMIN — Medication 10 ML: at 10:01

## 2021-01-06 RX ADMIN — PREGABALIN 25 MG: 25 CAPSULE ORAL at 10:01

## 2021-01-06 RX ADMIN — GABAPENTIN 100 MG: 100 CAPSULE ORAL at 22:19

## 2021-01-06 RX ADMIN — NEOMYCIN SULFATE, POLYMYXIN B SULFATE, HYDROCORTISONE 3 DROP: 3.5; 10000; 1 SOLUTION/ DROPS AURICULAR (OTIC) at 13:42

## 2021-01-06 RX ADMIN — ENOXAPARIN SODIUM 30 MG: 30 INJECTION SUBCUTANEOUS at 22:18

## 2021-01-06 RX ADMIN — ENOXAPARIN SODIUM 30 MG: 30 INJECTION SUBCUTANEOUS at 10:00

## 2021-01-06 RX ADMIN — SODIUM CHLORIDE, PRESERVATIVE FREE 10 ML: 5 INJECTION INTRAVENOUS at 17:42

## 2021-01-06 RX ADMIN — FAMOTIDINE 20 MG: 20 TABLET, FILM COATED ORAL at 22:18

## 2021-01-06 RX ADMIN — OXCARBAZEPINE 300 MG: 150 TABLET, FILM COATED ORAL at 10:00

## 2021-01-06 RX ADMIN — ACYCLOVIR 400 MG: 200 CAPSULE ORAL at 13:42

## 2021-01-06 RX ADMIN — ACETAMINOPHEN 650 MG: 325 TABLET ORAL at 10:00

## 2021-01-06 RX ADMIN — ZINC SULFATE 220 MG (50 MG) CAPSULE 50 MG: CAPSULE at 10:01

## 2021-01-06 RX ADMIN — PREGABALIN 25 MG: 25 CAPSULE ORAL at 22:19

## 2021-01-06 RX ADMIN — OXYCODONE HYDROCHLORIDE AND ACETAMINOPHEN 1000 MG: 500 TABLET ORAL at 10:00

## 2021-01-06 RX ADMIN — ACYCLOVIR 400 MG: 200 CAPSULE ORAL at 10:00

## 2021-01-06 RX ADMIN — ACYCLOVIR 400 MG: 200 CAPSULE ORAL at 22:19

## 2021-01-06 RX ADMIN — GUAIFENESIN AND DEXTROMETHORPHAN 5 ML: 100; 10 SYRUP ORAL at 09:59

## 2021-01-06 RX ADMIN — METOPROLOL TARTRATE 25 MG: 25 TABLET, FILM COATED ORAL at 22:19

## 2021-01-06 RX ADMIN — NEOMYCIN SULFATE, POLYMYXIN B SULFATE, HYDROCORTISONE 3 DROP: 3.5; 10000; 1 SOLUTION/ DROPS AURICULAR (OTIC) at 18:25

## 2021-01-06 RX ADMIN — FAMOTIDINE 20 MG: 20 TABLET, FILM COATED ORAL at 10:01

## 2021-01-06 RX ADMIN — OXCARBAZEPINE 300 MG: 150 TABLET, FILM COATED ORAL at 22:19

## 2021-01-06 RX ADMIN — NEOMYCIN SULFATE, POLYMYXIN B SULFATE, HYDROCORTISONE 3 DROP: 3.5; 10000; 1 SOLUTION/ DROPS AURICULAR (OTIC) at 05:43

## 2021-01-06 RX ADMIN — REMDESIVIR 100 MG: 100 INJECTION, POWDER, LYOPHILIZED, FOR SOLUTION INTRAVENOUS at 13:42

## 2021-01-06 RX ADMIN — DULOXETINE HYDROCHLORIDE 30 MG: 30 CAPSULE, DELAYED RELEASE ORAL at 10:01

## 2021-01-06 RX ADMIN — LEVOTHYROXINE SODIUM 137 MCG: 25 TABLET ORAL at 05:42

## 2021-01-06 RX ADMIN — Medication 2000 UNITS: at 10:00

## 2021-01-06 RX ADMIN — DEXAMETHASONE 6 MG: 4 TABLET ORAL at 10:01

## 2021-01-06 ASSESSMENT — PAIN DESCRIPTION - DESCRIPTORS: DESCRIPTORS: TENDER;SORE

## 2021-01-06 ASSESSMENT — PAIN DESCRIPTION - LOCATION: LOCATION: JAW

## 2021-01-06 ASSESSMENT — PAIN - FUNCTIONAL ASSESSMENT: PAIN_FUNCTIONAL_ASSESSMENT: PREVENTS OR INTERFERES SOME ACTIVE ACTIVITIES AND ADLS

## 2021-01-06 ASSESSMENT — PAIN DESCRIPTION - ONSET: ONSET: SUDDEN

## 2021-01-07 VITALS
SYSTOLIC BLOOD PRESSURE: 162 MMHG | HEIGHT: 61 IN | WEIGHT: 188.2 LBS | DIASTOLIC BLOOD PRESSURE: 66 MMHG | HEART RATE: 52 BPM | OXYGEN SATURATION: 95 % | RESPIRATION RATE: 16 BRPM | BODY MASS INDEX: 35.53 KG/M2 | TEMPERATURE: 97.8 F

## 2021-01-07 LAB
ALBUMIN SERPL-MCNC: 3.4 G/DL (ref 3.5–5.2)
ALP BLD-CCNC: 86 U/L (ref 35–104)
ALT SERPL-CCNC: 31 U/L (ref 0–32)
ANION GAP SERPL CALCULATED.3IONS-SCNC: 7 MMOL/L (ref 7–16)
AST SERPL-CCNC: 40 U/L (ref 0–31)
BASOPHILS ABSOLUTE: 0.01 E9/L (ref 0–0.2)
BASOPHILS RELATIVE PERCENT: 0.2 % (ref 0–2)
BILIRUB SERPL-MCNC: <0.2 MG/DL (ref 0–1.2)
BUN BLDV-MCNC: 13 MG/DL (ref 8–23)
CALCIUM SERPL-MCNC: 8.8 MG/DL (ref 8.6–10.2)
CHLORIDE BLD-SCNC: 104 MMOL/L (ref 98–107)
CO2: 27 MMOL/L (ref 22–29)
CREAT SERPL-MCNC: 0.7 MG/DL (ref 0.5–1)
D DIMER: <200 NG/ML DDU
EOSINOPHILS ABSOLUTE: 0.01 E9/L (ref 0.05–0.5)
EOSINOPHILS RELATIVE PERCENT: 0.2 % (ref 0–6)
FIBRINOGEN: 363 MG/DL (ref 225–540)
GFR AFRICAN AMERICAN: >60
GFR NON-AFRICAN AMERICAN: >60 ML/MIN/1.73
GLUCOSE BLD-MCNC: 125 MG/DL (ref 74–99)
HCT VFR BLD CALC: 38.5 % (ref 34–48)
HEMOGLOBIN: 12.7 G/DL (ref 11.5–15.5)
IMMATURE GRANULOCYTES #: 0.03 E9/L
IMMATURE GRANULOCYTES %: 0.6 % (ref 0–5)
INR BLD: 0.9
LYMPHOCYTES ABSOLUTE: 1.48 E9/L (ref 1.5–4)
LYMPHOCYTES RELATIVE PERCENT: 28.1 % (ref 20–42)
MCH RBC QN AUTO: 29.4 PG (ref 26–35)
MCHC RBC AUTO-ENTMCNC: 33 % (ref 32–34.5)
MCV RBC AUTO: 89.1 FL (ref 80–99.9)
MONOCYTES ABSOLUTE: 0.34 E9/L (ref 0.1–0.95)
MONOCYTES RELATIVE PERCENT: 6.5 % (ref 2–12)
NEUTROPHILS ABSOLUTE: 3.39 E9/L (ref 1.8–7.3)
NEUTROPHILS RELATIVE PERCENT: 64.4 % (ref 43–80)
PDW BLD-RTO: 13.2 FL (ref 11.5–15)
PLATELET # BLD: 202 E9/L (ref 130–450)
PMV BLD AUTO: 10.1 FL (ref 7–12)
POTASSIUM REFLEX MAGNESIUM: 4.1 MMOL/L (ref 3.5–5)
PROTHROMBIN TIME: 10.7 SEC (ref 9.3–12.4)
RBC # BLD: 4.32 E12/L (ref 3.5–5.5)
SODIUM BLD-SCNC: 138 MMOL/L (ref 132–146)
TOTAL PROTEIN: 6.6 G/DL (ref 6.4–8.3)
WBC # BLD: 5.3 E9/L (ref 4.5–11.5)

## 2021-01-07 PROCEDURE — 85610 PROTHROMBIN TIME: CPT

## 2021-01-07 PROCEDURE — 36415 COLL VENOUS BLD VENIPUNCTURE: CPT

## 2021-01-07 PROCEDURE — 99239 HOSP IP/OBS DSCHRG MGMT >30: CPT | Performed by: STUDENT IN AN ORGANIZED HEALTH CARE EDUCATION/TRAINING PROGRAM

## 2021-01-07 PROCEDURE — 85025 COMPLETE CBC W/AUTO DIFF WBC: CPT

## 2021-01-07 PROCEDURE — 97530 THERAPEUTIC ACTIVITIES: CPT

## 2021-01-07 PROCEDURE — 6370000000 HC RX 637 (ALT 250 FOR IP): Performed by: FAMILY MEDICINE

## 2021-01-07 PROCEDURE — 80053 COMPREHEN METABOLIC PANEL: CPT

## 2021-01-07 PROCEDURE — 85378 FIBRIN DEGRADE SEMIQUANT: CPT

## 2021-01-07 PROCEDURE — 97161 PT EVAL LOW COMPLEX 20 MIN: CPT

## 2021-01-07 PROCEDURE — 85384 FIBRINOGEN ACTIVITY: CPT

## 2021-01-07 PROCEDURE — 2580000003 HC RX 258: Performed by: FAMILY MEDICINE

## 2021-01-07 PROCEDURE — 6360000002 HC RX W HCPCS: Performed by: FAMILY MEDICINE

## 2021-01-07 RX ORDER — GABAPENTIN 100 MG/1
100 CAPSULE ORAL NIGHTLY
Qty: 90 CAPSULE | Refills: 0 | Status: SHIPPED | OUTPATIENT
Start: 2021-01-07 | End: 2022-07-13 | Stop reason: ALTCHOICE

## 2021-01-07 RX ORDER — DULOXETIN HYDROCHLORIDE 30 MG/1
30 CAPSULE, DELAYED RELEASE ORAL DAILY
Qty: 30 CAPSULE | Refills: 0 | Status: SHIPPED | OUTPATIENT
Start: 2021-01-08 | End: 2022-07-13 | Stop reason: ALTCHOICE

## 2021-01-07 RX ORDER — DEXAMETHASONE 6 MG/1
6 TABLET ORAL DAILY
Qty: 10 TABLET | Refills: 0 | Status: SHIPPED | OUTPATIENT
Start: 2021-01-08 | End: 2021-01-18

## 2021-01-07 RX ORDER — ZINC SULFATE 50(220)MG
50 CAPSULE ORAL DAILY
Qty: 10 CAPSULE | Refills: 0 | COMMUNITY
Start: 2021-01-08 | End: 2022-07-13 | Stop reason: ALTCHOICE

## 2021-01-07 RX ORDER — CHOLECALCIFEROL (VITAMIN D3) 50 MCG
2000 TABLET ORAL DAILY
Qty: 10 TABLET | Refills: 0 | Status: SHIPPED | OUTPATIENT
Start: 2021-01-08 | End: 2022-07-13 | Stop reason: ALTCHOICE

## 2021-01-07 RX ADMIN — PREGABALIN 25 MG: 25 CAPSULE ORAL at 14:30

## 2021-01-07 RX ADMIN — FAMOTIDINE 20 MG: 20 TABLET, FILM COATED ORAL at 08:38

## 2021-01-07 RX ADMIN — DULOXETINE HYDROCHLORIDE 30 MG: 30 CAPSULE, DELAYED RELEASE ORAL at 08:38

## 2021-01-07 RX ADMIN — ACYCLOVIR 400 MG: 200 CAPSULE ORAL at 14:30

## 2021-01-07 RX ADMIN — NEOMYCIN SULFATE, POLYMYXIN B SULFATE, HYDROCORTISONE 3 DROP: 3.5; 10000; 1 SOLUTION/ DROPS AURICULAR (OTIC) at 11:55

## 2021-01-07 RX ADMIN — ACYCLOVIR 400 MG: 200 CAPSULE ORAL at 08:37

## 2021-01-07 RX ADMIN — NEOMYCIN SULFATE, POLYMYXIN B SULFATE, HYDROCORTISONE 3 DROP: 3.5; 10000; 1 SOLUTION/ DROPS AURICULAR (OTIC) at 01:48

## 2021-01-07 RX ADMIN — POLYETHYLENE GLYCOL 3350 17 G: 17 POWDER, FOR SOLUTION ORAL at 11:54

## 2021-01-07 RX ADMIN — ZINC SULFATE 220 MG (50 MG) CAPSULE 50 MG: CAPSULE at 08:38

## 2021-01-07 RX ADMIN — METOPROLOL TARTRATE 25 MG: 25 TABLET, FILM COATED ORAL at 08:38

## 2021-01-07 RX ADMIN — DEXAMETHASONE 6 MG: 4 TABLET ORAL at 08:37

## 2021-01-07 RX ADMIN — OXCARBAZEPINE 300 MG: 150 TABLET, FILM COATED ORAL at 08:38

## 2021-01-07 RX ADMIN — Medication 2000 UNITS: at 08:37

## 2021-01-07 RX ADMIN — SODIUM CHLORIDE, SODIUM LACTATE, POTASSIUM CHLORIDE, CALCIUM CHLORIDE AND DEXTROSE MONOHYDRATE: 5; 600; 310; 30; 20 INJECTION, SOLUTION INTRAVENOUS at 01:48

## 2021-01-07 RX ADMIN — ENOXAPARIN SODIUM 30 MG: 30 INJECTION SUBCUTANEOUS at 08:38

## 2021-01-07 RX ADMIN — LEVOTHYROXINE SODIUM 137 MCG: 25 TABLET ORAL at 05:41

## 2021-01-07 RX ADMIN — PREGABALIN 25 MG: 25 CAPSULE ORAL at 08:38

## 2021-01-07 RX ADMIN — OXYCODONE HYDROCHLORIDE AND ACETAMINOPHEN 1000 MG: 500 TABLET ORAL at 08:38

## 2021-01-07 RX ADMIN — NEOMYCIN SULFATE, POLYMYXIN B SULFATE, HYDROCORTISONE 3 DROP: 3.5; 10000; 1 SOLUTION/ DROPS AURICULAR (OTIC) at 05:41

## 2021-01-07 NOTE — DISCHARGE SUMMARY
[facility] Physician Discharge Summary       No follow-up provider specified. Activity level: As tolerated     Dispo: Home with self care    Condition on discharge: Stable     Patient ID:  Fritz Dawson  [id number]  35 y.o.  1934    Admit date: 1/2/2021    Discharge date and time:  1/7/2021  2:08 PM    Admission Diagnoses: Active Problems:    Acute respiratory failure due to COVID-19 [facility])    COVID-19  Resolved Problems:    * No resolved hospital problems. *      Discharge Diagnoses: Active Problems:    Acute respiratory failure due to COVID-19 [facility])    COVID-19  Resolved Problems:    * No resolved hospital problems. *      Consults:  IP CONSULT TO PHARMACY  IP CONSULT TO IV TEAM    Procedures: as per hospital course    Hospital Course:   Patient Fritz Dawson is a 80 y.o. presented with Acute respiratory failure due to COVID-19 ([facility] [U07.1, J96.00]  COVID-19 [U07.1]  Patient was admitted with fatigue and was also experiencing shortness of breath and diarrhea. Patient was found to be COVID-19 positive And was treated with remdesivir and Decadron. Patient is being discharged in stable condition. Discharge Exam:    General Appearance: alert and oriented to person, place and time and in no acute distress  Skin: warm and dry  Head: normocephalic and atraumatic  Eyes: pupils equal, round, and reactive to light, extraocular eye movements intact, conjunctivae normal  Neck: neck supple and non tender without mass   Pulmonary/Chest: clear to auscultation bilaterally- no wheezes, rales or rhonchi, normal air movement, no respiratory distress  Cardiovascular: normal rate, normal S1 and S2 and no carotid bruits  Abdomen: soft, non-tender, non-distended, normal bowel sounds, no masses or organomegaly  Extremities: no cyanosis, no clubbing and no edema  Neurologic: no cranial nerve deficit and speech normal    I/O last 3 completed shifts: In: 701 [I.V.:701]  Out: -   I/O this shift:   In: 1185 [P.O.:360; I.V.:825]  Out: -       LABS:  Recent Labs     01/05/21  0525 01/06/21  0540 01/07/21  0157    138 138   K 3.9 3.6 4.1    104 104   CO2 26 29 27   BUN 15 14 13   CREATININE 0.8 0.8 0.7   GLUCOSE 89 109* 125*   CALCIUM 9.0 8.8 8.8       Recent Labs     01/05/21  0525 01/06/21  0540 01/07/21  0157   WBC 4.0* 3.5* 5.3   RBC 4.28 3.95 4.32   HGB 12.5 11.6 12.7   HCT 38.7 35.6 38.5   MCV 90.4 90.1 89.1   MCH 29.2 29.4 29.4   MCHC 32.3 32.6 33.0   RDW 13.4 13.2 13.2    172 202   MPV 10.3 9.9 10.1       No results for input(s): POCGLU in the last 72 hours. Imaging:  Xr Chest (2 Vw)    Result Date: 1/2/2021  EXAMINATION: TWO XRAY VIEWS OF THE CHEST 1/2/2021 2:52 pm COMPARISON: July 21, 2020 HISTORY: ORDERING SYSTEM PROVIDED HISTORY: Cough/SOB TECHNOLOGIST PROVIDED HISTORY: Reason for exam:->Cough/SOB FINDINGS: No airspace opacity or pleural effusion. The heart is normal size. No pneumothorax. No free air beneath the hemidiaphragms. No pneumonia or pleural effusion. Patient Instructions:      Medication List      START taking these medications    ascorbic acid 1000 MG tablet  Commonly known as: VITAMIN C  Take 1 tablet by mouth daily for 10 days  Start taking on: January 8, 2021     dexamethasone 6 MG tablet  Commonly known as: DECADRON  Take 1 tablet by mouth daily for 10 days  Start taking on: January 8, 2021     gabapentin 100 MG capsule  Commonly known as: NEURONTIN  Take 1 capsule by mouth nightly for 30 days.      vitamin D 50 MCG (2000 UT) Tabs tablet  Commonly known as: CHOLECALCIFEROL  Take 1 tablet by mouth daily for 10 days  Start taking on: January 8, 2021     zinc sulfate 220 (50 Zn) MG capsule  Commonly known as: ZINCATE  Take 1 capsule by mouth daily for 10 days  Start taking on: January 8, 2021        CHANGE how you take these medications    OXcarbazepine 150 MG tablet  Commonly known as: Trileptal  Take 2 tablets by mouth 2 times daily  What changed: when to take

## 2021-01-07 NOTE — CARE COORDINATION
RDV completed; currently on room air; spoke with pt by phone this am that the plan is home with family; denies any needs; declines [facility] Valadez. Family can transport home. Triston Barry.

## 2021-01-07 NOTE — PROGRESS NOTES
[facility] Progress Note    Admitting Date and Time: 1/2/2021  2:52 PM  Admit Dx: Acute respiratory failure due to COVID-19 ([facility] [address] [U07.1, J96.00]  COVID-19 [U07.1]    Subjective:  Patient is being followed for Acute respiratory failure due to COVID-19 ([facility] [address] [U07.1, J96.00]  COVID-19 [U07.1]   Pt feels improvement in her breathing, presently not on any oxygen. Patient does not report any more diarrhea instead has constipation for past 2 days. Denies any abdominal discomfort. Per RN: No major concerns voiced. ROS: denies fever, chills, cp, sob, n/v, HA unless stated above.       neomycin-polymyxin-hydrocortisone  3 drop Both Ears 4 times per day    pregabalin  25 mg Oral TID    acyclovir  400 mg Oral TID    gabapentin  100 mg Oral Nightly    levothyroxine  137 mcg Oral Daily    metoprolol tartrate  25 mg Oral BID    OXcarbazepine  300 mg Oral BID    DULoxetine  30 mg Oral Daily    sodium chloride flush  10 mL Intravenous 2 times per day    enoxaparin  30 mg Subcutaneous BID    famotidine  20 mg Oral BID    dexamethasone  6 mg Oral Daily    Vitamin D  2,000 Units Oral Daily    zinc sulfate  50 mg Oral Daily    ascorbic acid  1,000 mg Oral Daily         sodium chloride, 30 mL, PRN      sodium chloride flush, 10 mL, PRN      polyethylene glycol, 17 g, Daily PRN      acetaminophen, 650 mg, Q6H PRN    Or      acetaminophen, 650 mg, Q6H PRN      guaiFENesin-dextromethorphan, 5 mL, Q4H PRN      trimethobenzamide, 200 mg, Q6H PRN         Objective:    BP (!) 143/69   Pulse 57   Temp 98.2 °F (36.8 °C) (Oral)   Resp 16   Ht 5' 1\" (1.549 m)   Wt 188 lb 3.2 oz (85.4 kg)   SpO2 94%   BMI 35.56 kg/m²     General Appearance: alert and oriented to person, place and time and in no acute distress  Skin: warm and dry  Head: normocephalic and atraumatic  Eyes: pupils equal, round, and reactive to light, extraocular eye movements intact, conjunctivae normal  Neck: neck supple and non tender without mass   Pulmonary/Chest: clear to auscultation bilaterally- no wheezes, rales or rhonchi, normal air movement, no respiratory distress  Cardiovascular: normal rate, normal S1 and S2 and no carotid bruits  Abdomen: soft, non-tender, non-distended, normal bowel sounds, no masses or organomegaly  Extremities: no cyanosis, no clubbing and no edema  Neurologic: no cranial nerve deficit and speech normal        Recent Labs     01/04/21  1140 01/05/21  0525 01/06/21  0540    136 138   K 3.7 3.9 3.6    103 104   CO2 25 26 29   BUN 16 15 14   CREATININE 0.8 0.8 0.8   GLUCOSE 106* 89 109*   CALCIUM 8.6 9.0 8.8       Recent Labs     01/04/21  1140 01/05/21  0525 01/06/21  0540   WBC 4.4* 4.0* 3.5*   RBC 4.15 4.28 3.95   HGB 12.0 12.5 11.6   HCT 38.2 38.7 35.6   MCV 92.0 90.4 90.1   MCH 28.9 29.2 29.4   MCHC 31.4* 32.3 32.6   RDW 13.4 13.4 13.2    228 172   MPV 10.3 10.3 9.9       Micro:  No components found for: [facility])    Radiology:   No results found. Assessment:    Active Problems:    Acute respiratory failure due to COVID-19 [facility])    COVID-19  Resolved Problems:    * No resolved hospital problems. *      Plan:  1. Acute respiratory failure due to COVID-19: Resolved. Continue dexamethasone, remdesivir. Continue breathing treatment.     2.  Hypertension essential:  Continue metoprolol. Blood pressure is well controlled.     3. Hypothyroidism: On Synthroid.     4.    Diarrhea : Resolved presently constipated. Does not seem to have C. difficile infection, and stated the initial diarrhea might be due to the Covid. 5.  Depression/anxiety: Continue home medication. On Cymbalta.     6. Physical deconditioning -will get PT OT on board and wait for the recommendations for placement before discharge. DVT prophylaxis -  Lovenox        NOTE: This report was transcribed using voice recognition software.  Every effort was made to ensure accuracy; however, inadvertent computerized transcription errors may be present.   Electronically signed by Rolland Ormond, MD on 1/6/2021 at 7:01 PM

## 2021-01-07 NOTE — PROGRESS NOTES
CLINICAL PHARMACY NOTE: MEDS TO [address] Select Patient?: No  Total # of Prescriptions Filled: 3   The following medications were delivered to the patient:  · Duloxetine 30mg  · Dexamethasone 6mg  · Gabapentin 100mg  Total # of Interventions Completed: 5  Time Spent (min): 45    Additional Documentation:

## 2021-01-07 NOTE — PROGRESS NOTES
Physical Therapy    Facility/Department: [address] MED SURG/TELE  Initial Assessment    NAME: Cathy Mcardle  : 1934  MRN: [id number]    Date of Service: 2021      Attending Provider:  Jose Hill MD    Evaluating PT:  Ann Marie Bender. Nini Charles, P.T. Room #:  5515/8195-C  Diagnosis:  Acute Respiratory Failure due to COVID 19+  Precautions:  falls    SUBJECTIVE:    Pt lives with her 2 blind children (one currently has [place] and hospice services at home) in a 2 story home with 2 stairs and no rail to enter. There are 12 steps and 1 rail to 2nd floor bed and bath. She reports she has been sleeping on couch to help care for her son on hospice. Pt ambulated with no AD PTA. OBJECTIVE:   Initial Evaluation  Date: 21 Treatment Short Term/ Long Term   Goals   Was pt agreeable to Eval/treatment? yes     Does pt have pain? No c/o pain     Bed Mobility  Rolling: Independent  Supine to sit: Independent  Sit to supine: NA  Scooting: Independent  Independent    Transfers Sit to stand: supervision  Stand to sit: supervision  Stand pivot: supervision   Independent    Ambulation   15+50 feet with no AD SBA  100 feet with no AD Independent    Stair negotiation: ascended and descended NA  10 steps with 1 rail supervision (if pt gets out of isolation)   AM-PAC 6 Clicks 98/06       BLE ROM is WFL. BLE strength is grossly 4/5. Sensation:  Pt denies numbness and tingling to extremities  Edema:  None noted  Balance: sitting is Independent and standing with no AD is supervision  Endurance: fair+    ASSESSMENT:    Comments:  Pt walked in the room with mild limp and mild unsteady gait that is her baseline according to her. She had no LOB or SOB with amb. She reported need to use BR and walked into BR and transferred on/off commode with supervision. She performed her own self care and stood at sink to wash her hands with supervision for balance. After amb in her room she asked to sit up in chair.      Treatment:  Patient

## 2021-01-08 ENCOUNTER — CARE COORDINATION (OUTPATIENT)
Dept: CASE MANAGEMENT | Age: 86
End: 2021-01-08

## 2021-01-08 NOTE — CARE COORDINATION
[facility] Transitions Initial Follow Up Call    Call within 2 business days of discharge: Yes    Patient: Pau Molina Patient : 1934   MRN: [id number]  Reason for Admission: Acute respiratory failure d/t COVID-19  Discharge Date: 21 RARS: Readmission Risk Score: 12      Last Discharge [facility]       Complaint Diagnosis Description Type Department Provider    21 Shortness of Breath; Diarrhea Hypoxia . .. ED to Hosp-Admission (Discharged) (ADMITTED) ROD Hoskins MD; Moy Davis. .. Challenges to be reviewed by the provider   Additional needs identified to be addressed with provider No  none    Discussed COVID-19 related testing which was available at this time. Test results were positive. Patient informed of results, if available? Yes         Method of communication with provider : none    Advance Care Planning:   Does patient have an Advance Directive:  decision maker updated. Was this a readmission? No  Patient stated reason for admission: diarrhea and \"sick to my stomach\"  Patients top risk factors for readmission: medical condition    Care Transition Nurse (CTN) contacted the patient by telephone to perform post hospital discharge assessment. Verified name and  with patient as identifiers. Provided introduction to self, and explanation of the CTN role. CTN reviewed discharge instructions, medical action plan and red flags with patient who verbalized understanding. Patient given an opportunity to ask questions and does not have any further questions or concerns at this time. Were discharge instructions available to patient? Yes. Reviewed appropriate site of care based on symptoms and resources available to patient including: PCP, Urgent care clinics, When to call 911 and Condition related references. The patient agrees to contact the PCP office for questions related to their healthcare.      Medication reconciliation was performed with patient, who verbalizes understanding of administration of home medications. Advised obtaining a 90-day supply of all daily and as-needed medications. Covid Risk Education    Patient has following risk factors of: acute respiratory failure. Education provided regarding infection prevention, and signs and symptoms of COVID-19 and when to seek medical attention with patient who verbalized understanding. Discussed exposure protocols and quarantine From CDC: Are you at higher risk for severe illness?   and given an opportunity for questions and concerns. The patient agrees to contact the COVID-19 hotline [phone] or PCP office for questions related to COVID-19. For more information on steps you can take to protect yourself, see CDC's How to Protect Yourself     Patient/family/caregiver given information for [facility] and agrees to enroll no  Patient's preferred e-mail: declines  Patient's preferred phone number: declines    Discussed follow-up appointments. If no appointment was previously scheduled, appointment scheduling offered: Yes. Is follow up appointment scheduled within 7 days of discharge? No  Non-[facility] follow up appointment(s): CTN confirmed with patient she will follow up with Dr. Rohith Ashby (PCP) and declines needing any CTN assistance. Plan for follow-up call in 5-7 days based on severity of symptoms and risk factors. Plan for next call: follow up appointment-Did patient get sxheudled for PCP follow up? Non-face-to-face services provided:  Scheduled appointment with PCP-CTN confirmed with patient she will call and schedule follow up with Dr. Rohith Ashby (PCP)  Obtained and reviewed discharge summary and/or continuity of care documents  Education of patient/family/caregiver/guardian to support self-management-Discussed COVID precautions and knowing when to seek medical attention.   Assessment and support for treatment adherence and medication management-Advised to take Decadron as directed until completely

## 2021-01-15 ENCOUNTER — CARE COORDINATION (OUTPATIENT)
Dept: CASE MANAGEMENT | Age: 86
End: 2021-01-15

## 2021-01-15 NOTE — CARE COORDINATION
You Patient resolved from the Care Transitions episode on 1/15/21  Discussed COVID-19 related testing which was available at this time. Test results were positive. Patient informed of results, if available? Yes    Patient/family has been provided the following resources and education related to COVID-19:                         Signs, symptoms and red flags related to COVID-19            CDC exposure and quarantine guidelines            [employer] exposure contact - [phone]            Contact for their local Department of Health                 Patient currently reports that the following symptoms have improved:  no new/worsening symptoms     Spoke with patient today 1/15/21 for hospital discharge/COVID-19+ final follow up call. Lam Jones reports feeling tired but is improving. States her son passed away on Monday and will be buried tomorrow. CTN offered active listening, empathy and offered condolences. Denies any shortness of breath, chest pain, chest discomfort, nausea, vomiting, diarrhea, chills or fever. States she continues on Decadron which is almost gone and tolerating well. Reiterated COVID precautions and knowing when to seek medical attention. States she has not made PCP follow up appointment as she wants to get through her son's  and burial. Denies any further needs or concerns at this time. CTN signing off. No further outreach scheduled with this CTN/ACM. Episode of Care resolved. Patient has this CTN/ACM contact information if future needs arise.

## 2021-04-01 ENCOUNTER — OFFICE VISIT (OUTPATIENT)
Dept: NEUROLOGY | Age: 86
End: 2021-04-01
Payer: MEDICARE

## 2021-04-01 VITALS
OXYGEN SATURATION: 94 % | SYSTOLIC BLOOD PRESSURE: 133 MMHG | WEIGHT: 197 LBS | DIASTOLIC BLOOD PRESSURE: 74 MMHG | HEIGHT: 62 IN | TEMPERATURE: 97.1 F | RESPIRATION RATE: 18 BRPM | BODY MASS INDEX: 36.25 KG/M2 | HEART RATE: 62 BPM

## 2021-04-01 DIAGNOSIS — G50.0 TRIGEMINAL NEURALGIA OF RIGHT SIDE OF FACE: Primary | ICD-10-CM

## 2021-04-01 PROCEDURE — 99215 OFFICE O/P EST HI 40 MIN: CPT | Performed by: PSYCHIATRY & NEUROLOGY

## 2021-04-01 NOTE — PROGRESS NOTES
This 80-year-old right-handed woman was referred for evaluation and management of right facial pains. The patient and her niece continued as excellent historians. Her medications remained Trileptal, metoprolol, levothyroxine and iron supplementations. She was previously on duloxetine, vitamin D and vitamin C.    Her past medical history was remarkable for hypertension and hypothyroidism, still controlled with metoprolol alone. She had experienced syncopal episodes, secondary to vasovagal events. She again denied strokes, seizures, headaches, other heart disorders, lung disease, connective tissue disorders, cancers, skin abnormalities, kidney disease or depression. She slept moderately well. Her eating was impaired due to her marked right facial pains. Unfortunately since her last visit her son  of metastatic prostatic cancer. Years ago she suffered from left facial pains, lightninglike sensations, then persisting for minutes to hours. These discomforts involved only her lower jaw. She was treated with vascular decompression, with excellent resolution of those discomforts. She was left with sensory loss over her left cheek and jaw. Her niece again noted minimal left facial droop. However, there were no other facial abnormalities, swallowing or chewing difficulties, speech issues or memory problems. 3 years ago she experienced right facial pains. These discomforts involved her right cheek and jaw. These were burning, stabbing sensations, lasting for minutes to hours. These were aggravated by touching her right face, swallowing or chewing. She denied any forehead involvement, visual abnormalities, swallowing difficulties or cognitive decline. There was never arm or leg involvement. I diagnosed her with atypical facial pains. Previously, she had obtained good relief with Trileptal 450 mg twice and then 3 times daily. Since her last visit months ago, the pains have worsened. However, she now describes marked discomforts along her right jaw, aggravated by eating, talking or chewing. She was unable to tolerate duloxetine or gabapentin. Her knees requested vascular studies to consider decompression therapy. Previously, temporomandibular joint dysfunction have been ruled out. However, she had not seen her dentist in over a year due to the pandemic. She denied other neurological or medical issues. Review of systems was otherwise unremarkable. Physical examination displayed stable vital signs. She was an elderly woman in moderate distress due to her pains, who was alert, cooperative and oriented. She remained pleasant. Her skin was unremarkable. Head examination now revealed marked tenderness at the right temporomandibular joint. Opening and closing her jaw aggravated these pains, which the patient claimed were her current discomforts. There was occasional radiation of this pain into her right cheek. Her lungs were clear to auscultation. Cardiac testing proved unremarkable. Her limbs displayed no abnormalities. Neurological examination produced an intact mental status. She now noted only minimally decreased sensation over her right cheek and jaw. Touching her right face did not reproduce the pain. There was no facial weakness. Extraocular movements were intact. I found normal tone and bulk with 5/5 strength throughout. Reflexes were not elicited. Finger-nose and heel shin testing were performed well. Light touch and pinprick were intact in all limbs. Vibration was slightly decreased to both mid calves. She walked well. Laboratory data included a recent unremarkable CMP, with a GFR of 60. CBC with differential was unrevealing. A past CT scan of her face revealed minimal temporomandibular joint arthritis. An MRI of her brain from 2 years ago displayed minimal, remote small vessel disease.     This individual had presented with longstanding left trigeminal neuralgia and atypical facial pains, resolving with vascular decompression. She then presented with right-sided discomforts. She was not responding to Trileptal.  At this time, I found marked tenderness along the right temporomandibular joint. 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 I first recommended Motrin 2 tablets 2 or 3 times daily. Hopefully, she will soon see her dentist again. At her niece's wishes, MRAs of her head and MRI of her head were ordered to rule out vascular compression of the 5th cranial nerve    She is otherwise stable neurologically and medically. She will return in 4 months. Trileptal was reduced to 300 mg 3 times daily. Ibuprofen was added as needed. Again, hopefully she will see her dentist soon. Her niece will report back in 4 weeks. The patient and her niece will call at any time if problems arise. I spent 40 minutes with the patient with over 50 % spent in counseling and disease management. All patient issues were addressed and all questions were answered.

## 2021-04-12 ENCOUNTER — HOSPITAL ENCOUNTER (EMERGENCY)
Age: 86
Discharge: HOME | End: 2021-04-12
Payer: MEDICARE

## 2021-04-12 ENCOUNTER — IMMUNIZATION (OUTPATIENT)
Dept: PRIMARY CARE CLINIC | Age: 86
End: 2021-04-12
Payer: MEDICARE

## 2021-04-12 VITALS
HEART RATE: 65 BPM | BODY MASS INDEX: 34.96 KG/M2 | OXYGEN SATURATION: 97 % | DIASTOLIC BLOOD PRESSURE: 67 MMHG | RESPIRATION RATE: 14 BRPM | TEMPERATURE: 96.9 F | WEIGHT: 190 LBS | HEIGHT: 62 IN | SYSTOLIC BLOOD PRESSURE: 139 MMHG

## 2021-04-12 DIAGNOSIS — G50.0 TRIGEMINAL NEURALGIA: Primary | ICD-10-CM

## 2021-04-12 PROCEDURE — 91301 COVID-19, MODERNA VACCINE 100MCG/0.5ML DOSE: CPT | Performed by: FAMILY MEDICINE

## 2021-04-12 PROCEDURE — 99282 EMERGENCY DEPT VISIT SF MDM: CPT

## 2021-04-12 PROCEDURE — 6370000000 HC RX 637 (ALT 250 FOR IP): Performed by: PHYSICIAN ASSISTANT

## 2021-04-12 PROCEDURE — 0011A COVID-19, MODERNA VACCINE 100MCG/0.5ML DOSE: CPT | Performed by: FAMILY MEDICINE

## 2021-04-12 RX ORDER — OXYCODONE HYDROCHLORIDE AND ACETAMINOPHEN 5; 325 MG/1; MG/1
1 TABLET ORAL EVERY 6 HOURS PRN
Qty: 20 TABLET | Refills: 0 | Status: SHIPPED | OUTPATIENT
Start: 2021-04-12 | End: 2021-04-17

## 2021-04-12 RX ORDER — OXYCODONE HYDROCHLORIDE AND ACETAMINOPHEN 5; 325 MG/1; MG/1
1 TABLET ORAL ONCE
Status: COMPLETED | OUTPATIENT
Start: 2021-04-12 | End: 2021-04-12

## 2021-04-12 RX ADMIN — OXYCODONE HYDROCHLORIDE AND ACETAMINOPHEN 1 TABLET: 5; 325 TABLET ORAL at 14:24

## 2021-04-12 ASSESSMENT — PAIN DESCRIPTION - PROGRESSION: CLINICAL_PROGRESSION: NOT CHANGED

## 2021-04-12 ASSESSMENT — PAIN DESCRIPTION - DESCRIPTORS: DESCRIPTORS: PATIENT UNABLE TO DESCRIBE

## 2021-04-12 ASSESSMENT — PAIN DESCRIPTION - FREQUENCY: FREQUENCY: CONTINUOUS

## 2021-04-12 NOTE — ED PROVIDER NOTES
[facility]                                                                                                                                      Department of Emergency Medicine   ED  Provider Note  Admit Date/RoomTime: 4/12/2021  1:31 PM  ED Room: 29/29        HPI:  4/12/21,   Time: 2:02 PM EDT         Lorie Sen is a 80 y.o. female presenting to the ED for right sided emesis, beginning over a year ago but getting worse this past week. The complaint has been persistent, moderate in severity, and worsened by nothing. The patient has had chronic facial pain for quite some time and follows with neurology. She has been diagnosed with trigeminal neuralgia and is on Trileptal.  The patient is scheduled for an MRI MRA this weekend. According to the chart and that the neurologist notes she did not tolerate gabapentin. There is also been discussion about dysfunction of her TMJ. The patient states that she did recently see the dentist and was advised that everything there looks just fine. She is obviously frustrated with her symptoms. She states that she cannot sleep at night that because the pain is so bad. Pain is sharp intermittent and severe. It radiates from her cheek to the right ear. Nothing makes it better. He has been using just Tylenol at home for pain. She did not yet get the prescription filled for the Motrin. Patient is not on anything stronger for pain. There is no fever, chills or vomiting.         ROS:     Constitutional: Negative for fever and chills  HENT:  See HPI  Eyes: Negative for pain, discharge and redness  Respiratory:  Negative for shortness of breath, cough and wheezing  Cardiovascular: Negative for CP, edema or palpitations  Gastrointestinal: Negative for nausea, vomiting, diarrhea and abdominal distention  Genitourinary: Negative for dysuria and frequency  Musculoskeletal: Negative for back pain and arthralgia  Skin: Negative for rash and wound  Neurological: Negative for weakness and headaches  Hematological: Negative for adenopathy    All other systems reviewed and are negative      -------------------------------- PAST HISTORY ----------------------------------  Past Medical History:  has a past medical history of Allergic rhinitis, Chronic back pain, Dizziness and giddiness, Hypertension, Hypothyroidism, Iron deficiency, Neuropathy, Seizures ([facility] [address] and Vitamin B12 deficiency. Past Surgical History:  has a past surgical history that includes Hysterectomy; Appendectomy; Cholecystectomy; hernia repair (2002/2003); back surgery (MORE 10 YRS ); Breast surgery; Facial nerve surgery (2008); and Colonoscopy (11/1/2011). Social History:  reports that she has never smoked. She has never used smokeless tobacco. She reports that she does not drink alcohol or use drugs. Family History: family history includes Arthritis in her father and mother; Cancer in her brother, father, and sister; Diabetes in her brother, mother, and sister; Heart Disease in her brother, mother, and sister. The patients home medications have been reviewed. Allergies: Aspirin-acetaminophen-caffeine and Pcn [penicillins]    --------------------------------- RESULTS ------------------------------------------  All laboratory and radiology results have been personally reviewed by myself   LABS:  No results found for this visit on 04/12/21. RADIOLOGY:  Interpreted by Radiologist.  No orders to display       ----------------- NURSING NOTES AND VITALS REVIEWED ---------------   The nursing notes within the ED encounter and vital signs as below have been reviewed. /67   Pulse 65   Temp 96.9 °F (36.1 °C) (Tympanic)   Resp 14   Ht 5' 2\" (1.575 m)   Wt 190 lb (86.2 kg)   SpO2 97%   BMI 34.75 kg/m²   Oxygen Saturation Interpretation: Normal      --------------------------------PHYSICAL EXAM------------------------------------      Constitutional/General: Alert and oriented x3, well appearing. Head: NC/AT  Eyes: PERRL, EOMI  No obvious injury, rash or swelling to face. Pt is grimacing intermittently in pain and holding her right cheek. Mouth: Oropharynx clear, handling secretions, no trismus  Neck: Supple, full ROM, no meningeal signs  Pulmonary: Lungs clear to auscultation bilaterally, no wheezes, rales, or rhonchi. Not in respiratory distress  Cardiovascular:  Regular rate and rhythm, no murmurs, gallops, or rubs. 2+ distal pulses  Abdomen: Soft, + BS. No distension. Nontender. No palpable rigidity, rebound or guarding  Extremities: Moves all extremities x 4. Warm and well perfused  Skin: warm and dry without rash  Neurologic: GCS 15,  Intact. No focal deficits  Psych: Normal Affect      ------------------------ ED COURSE/MEDICAL DECISION MAKING----------------------  Medications   oxyCODONE-acetaminophen (PERCOCET) 5-325 MG per tablet 1 tablet (has no administration in time range)         Medical Decision Making:    Atypical right sided facial pain. Hx Trigeminal neuralgia and TMJ. Needs to fill script for Motrin. She is already on Trileptal and following with neurology. MRI set for this weekend. I'll allow for meds for pain, short term. We discussed frustrations treating and working up this type of pain. Needs to get back to see her specialist.   She is aware and agreeable to plan. Cautioned regarding falls, confusion or constipation with pain meds at her age. Counseling: The emergency provider has spoken with the patient and discussed todays results, in addition to providing specific details for the plan of care and counseling regarding the diagnosis and prognosis. Questions are answered at this time and they are agreeable with the plan.      ------------------------ IMPRESSION AND DISPOSITION -------------------------------    IMPRESSION  1.  Trigeminal neuralgia        DISPOSITION  Disposition: Discharge to home  Patient condition is stable                   Rosy BALES Tom Perez PA-C  04/12/21 1422

## 2021-04-19 ENCOUNTER — TELEPHONE (OUTPATIENT)
Dept: NEUROLOGY | Age: 86
End: 2021-04-19

## 2021-04-19 DIAGNOSIS — G50.0 TRIGEMINAL NEURALGIA OF RIGHT SIDE OF FACE: ICD-10-CM

## 2021-04-19 DIAGNOSIS — G50.0 TRIGEMINAL NEURALGIA: Primary | ICD-10-CM

## 2021-04-19 NOTE — TELEPHONE ENCOUNTER
Patient's niece called in stating that they would like a referral to [facility] for Facial Reconstruction and Facial Nerve Disorders, Dr. Munir Elena. Referral can be faxed to [phone]. Referral form given to Osvaldo to fill out.

## 2021-04-21 ENCOUNTER — TELEPHONE (OUTPATIENT)
Dept: NEUROLOGY | Age: 86
End: 2021-04-21

## 2021-04-21 NOTE — TELEPHONE ENCOUNTER
Aidee Davenport called in and would like MRI results.  [phone]  Electronically signed by Claribel Gutierrez MA on 4/21/21 at 9:02 AM EDT

## 2021-05-14 ENCOUNTER — IMMUNIZATION (OUTPATIENT)
Dept: PRIMARY CARE CLINIC | Age: 86
End: 2021-05-14
Payer: MEDICARE

## 2021-05-14 PROCEDURE — 0012A COVID-19, MODERNA VACCINE 100MCG/0.5ML DOSE: CPT | Performed by: FAMILY MEDICINE

## 2021-05-14 PROCEDURE — 91301 COVID-19, MODERNA VACCINE 100MCG/0.5ML DOSE: CPT | Performed by: FAMILY MEDICINE

## 2022-06-30 PROBLEM — E66.8 MODERATE OBESITY: Status: ACTIVE | Noted: 2018-09-18

## 2022-07-13 ENCOUNTER — OFFICE VISIT (OUTPATIENT)
Dept: CARDIOLOGY CLINIC | Age: 87
End: 2022-07-13
Payer: MEDICARE

## 2022-07-13 VITALS
BODY MASS INDEX: 37.28 KG/M2 | HEART RATE: 66 BPM | RESPIRATION RATE: 16 BRPM | OXYGEN SATURATION: 96 % | DIASTOLIC BLOOD PRESSURE: 60 MMHG | SYSTOLIC BLOOD PRESSURE: 138 MMHG | WEIGHT: 202.6 LBS | HEIGHT: 62 IN

## 2022-07-13 DIAGNOSIS — E66.8 MODERATE OBESITY: ICD-10-CM

## 2022-07-13 DIAGNOSIS — I10 PRIMARY HYPERTENSION: ICD-10-CM

## 2022-07-13 DIAGNOSIS — I44.7 LEFT BUNDLE BRANCH BLOCK: ICD-10-CM

## 2022-07-13 DIAGNOSIS — R06.09 EXERTIONAL DYSPNEA: Primary | ICD-10-CM

## 2022-07-13 PROCEDURE — 1123F ACP DISCUSS/DSCN MKR DOCD: CPT | Performed by: INTERNAL MEDICINE

## 2022-07-13 PROCEDURE — 99204 OFFICE O/P NEW MOD 45 MIN: CPT | Performed by: INTERNAL MEDICINE

## 2022-07-13 PROCEDURE — 93000 ELECTROCARDIOGRAM COMPLETE: CPT | Performed by: INTERNAL MEDICINE

## 2022-07-13 RX ORDER — OXCARBAZEPINE 300 MG/5ML
SUSPENSION ORAL
COMMUNITY
Start: 2022-06-28

## 2022-07-13 RX ORDER — LEVOTHYROXINE SODIUM 150 MCG
150 TABLET ORAL DAILY
COMMUNITY
Start: 2022-07-09

## 2022-07-13 NOTE — PROGRESS NOTES
OUTPATIENT CARDIOLOGY CONSULT    Name: Kiley Vigil    Age: 80 y.o. Date of Service: 7/13/2022    Reason for Consultation: Exertional dyspnea, hypertension, left bundle branch block, moderate obesity    Referring Physician: none    History of Present Illness: The patient is an 40-year-old white female with no known history of structural heart disease and a history of hypertension a chronically noted left bundle branch block and moderate obesity. Based on the MCC of her primary care physician, she no longer receives primary care and utilizes an urgent care for medical care. Presently, she relates symptoms of exertional dyspnea (functional class II) without additional manifestations of decompensated left ventricular systolic dysfunction or volume overload and in the absence of anginal-like chest discomfort or other ischemic equivalents. She denies changes of her functional capabilities and has noted no manifestations of an arrhythmia related nature. At time of most recent assessment at urgent care, apparently electrocardiogram was obtained demonstrating evidence of a left bundle branch block conduction pattern of a chronic nature with recommendations of cardiovascular assessment. At the time of her most recent assessment approximately 4 years earlier, an echocardiogram in the face of her conduction abnormalities demonstrated evidence of a normal-sized left ventricular chamber with mild concentric left ventricular hypertrophy in the absence of regional wall motion of base with normal left ventricular systolic function no significant valvular abnormalities. In comparison to weights of 1 year earlier, she is gained in excess of 10 pounds with systolic blood pressures at the upper limits of acceptable at the time of her evaluation. Review of Systems:    The remainder of a complete multisystem review including consitutional, central nervous, respiratory, circulatory, gastrointestinal, genitourinary, endocrinologic, hematologic, musculoskeletal and psychiatric are negative. Past Medical History:  Past Medical History:   Diagnosis Date    Allergic rhinitis     Chronic back pain     Dizziness and giddiness 02/25/2019    Hypertension     Hypothyroidism     Iron deficiency 02/25/2019    LBBB (left bundle branch block)     Neuropathy     Seizures ([address]     Vitamin B12 deficiency 02/25/2019       Past Surgical History:  Past Surgical History:   Procedure Laterality Date    APPENDECTOMY      BACK SURGERY  MORE 10 YRS     LUMBAR    BREAST SURGERY      MORE 10 YRS    CHOLECYSTECTOMY      COLONOSCOPY  11/1/2011    FACIAL NERVE SURGERY  2008    HERNIA REPAIR  2002/2003    HYSTERECTOMY (CERVIX STATUS UNKNOWN)         Family History:  Family History   Problem Relation Age of Onset    Diabetes Mother     Heart Disease Mother     Arthritis Mother     Cancer Father     Arthritis Father     Cancer Sister     Diabetes Sister     Heart Disease Sister     Cancer Brother     Diabetes Brother     Heart Disease Brother        Social History:  Social History     Socioeconomic History    Marital status:       Spouse name: Not on file    Number of children: Not on file    Years of education: Not on file    Highest education level: Not on file   Occupational History    Not on file   Tobacco Use    Smoking status: Never Smoker    Smokeless tobacco: Never Used   Vaping Use    Vaping Use: Never used   Substance and Sexual Activity    Alcohol use: Not Currently    Drug use: Never    Sexual activity: Never   Other Topics Concern    Not on file   Social History Narrative    Drinks 2-3 cups of tea daily     Social Determinants of Health     Financial Resource Strain:     Difficulty of Paying Living Expenses: Not on file   Food Insecurity:     Worried About Running Out of Food in the Last Year: Not on file    Karon of Food in the Last Year: Not on file   Transportation Needs:  Lack of Transportation (Medical): Not on file    Lack of Transportation (Non-Medical): Not on file   Physical Activity:     Days of Exercise per Week: Not on file    Minutes of Exercise per Session: Not on file   Stress:     Feeling of Stress : Not on file   Social Connections:     Frequency of Communication with Friends and Family: Not on file    Frequency of Social Gatherings with Friends and Family: Not on file    Attends [employer] Services: Not on file    Active Member of [address] [place] [address] or Organizations: Not on file    Attends Club or Organization Meetings: Not on file    Marital Status: Not on file   Intimate Partner Violence:     Fear of Current or Ex-Partner: Not on file    Emotionally Abused: Not on file    Physically Abused: Not on file    Sexually Abused: Not on file   Housing Stability:     Unable to Pay for Housing in the Last Year: Not on file    Number of [place] in the Last Year: Not on file    Unstable Housing in the Last Year: Not on file       Allergies: Allergies   Allergen Reactions    Aspirin-Acetaminophen-Caffeine     Pcn [Penicillins] Rash       Current Medications:  Current Outpatient Medications   Medication Sig Dispense Refill    SYNTHROID 150 MCG tablet Take 150 mcg by mouth Daily       OXcarbazepine (TRILEPTAL) 300 MG/5ML suspension TAKE 10 MLS BY MOUTH FOUR TIMES A DAY      metoprolol tartrate (LOPRESSOR) 25 MG tablet TAKE ONE TABLET BY MOUTH TWICE A DAY WITH FOOD  3     No current facility-administered medications for this visit. Physical Exam:  /60 (Site: Right Upper Arm, Position: Sitting, Cuff Size: Medium Adult)   Pulse 66   Resp 16   Ht 5' 2\" (1.575 m)   Wt 202 lb 9.6 oz (91.9 kg)   SpO2 96%   BMI 37.06 kg/m²   Wt Readings from Last 3 Encounters:   07/13/22 202 lb 9.6 oz (91.9 kg)   04/12/21 190 lb (86.2 kg)   04/01/21 197 lb (89.4 kg)     The patient is awake, alert and in no discomfort or distress.  No gross musculoskeletal deformity or lymphadenopathy are present. No significant skin or nail changes are present. Gross examination of head, eyes, nose and throat are negative. Jugular venous pressure is normal and no carotid bruits are present. No thyromegaly is noted. Normal respiratory effort is noted with no accessory muscle usage present. Lung fields are clear to ascultation. Cardiac examination is notable for a regular rate and rhythm with no palpable thrill. No gallop rhythm or cardiac murmur are identified. A benign abdominal examination is present with the exception of obesity and no masses or organomegaly. A normal abdominal aortic pulsation is present. Intact pulses are present throughout all extremities and no peripheral edema is present. No focal neurologic deficits are present. Laboratory Tests:  Lab Results   Component Value Date    CREATININE 0.7 01/07/2021    BUN 13 01/07/2021     01/07/2021    K 4.1 01/07/2021     01/07/2021    CO2 27 01/07/2021     Lab Results   Component Value Date    BNP 38 12/06/2012     Lab Results   Component Value Date/Time    WBC 5.3 01/07/2021 01:57 AM    RBC 4.32 01/07/2021 01:57 AM    HGB 12.7 01/07/2021 01:57 AM    HCT 38.5 01/07/2021 01:57 AM    MCV 89.1 01/07/2021 01:57 AM    MCH 29.4 01/07/2021 01:57 AM    MCHC 33.0 01/07/2021 01:57 AM    RDW 13.2 01/07/2021 01:57 AM     01/07/2021 01:57 AM    MPV 10.1 01/07/2021 01:57 AM     No results for input(s): ALKPHOS, ALT, AST, PROT, BILITOT, BILIDIR, LABALBU in the last 72 hours.   Lab Results   Component Value Date/Time    MG 2.2 08/18/2014 05:42 PM     Lab Results   Component Value Date/Time    PROTIME 10.7 01/07/2021 01:57 AM    INR 0.9 01/07/2021 01:57 AM     Lab Results   Component Value Date/Time    TSH 3.800 07/02/2015 08:36 AM     No components found for: CHLPL  Lab Results   Component Value Date    TRIG 319 (H) 07/02/2015    TRIG 162 (H) 06/14/2014    TRIG 136 01/04/2014     Lab Results   Component Value Date    HDL 51 07/02/2015 HDL 52 06/14/2014    HDL 58 01/04/2014     Lab Results   Component Value Date    LDLCALC 134 (H) 07/02/2015    LDLCALC 122 (H) 06/14/2014    LDLCALC 139 (H) 01/04/2014       Cardiac Tests:  ECG: A resting electrocardiogram demonstrates evidence of sinus rhythm with left axis deviation and a left bundle branch block conduction pattern      ASSESSMENT / PLAN: On clinical basis, the patient appears compensated from a cardiovascular standpoint with no active cardiovascular issues and a chronically noted left bundle branch block conduction pattern. Presently, I have discussed this with her as well as that of her exertional dyspnea being of a multifactorial nature with components likely related to diastolic dysfunction in the face of the combination of her age, hypertension and moderate obesity with needs of appropriate lifestyle modification to achieve weight reduction beneficial to diastolic cardiac performance as well as reducing her risk of the development of obstructive sleep apnea with associated adverse cardiovascular effects. Not recommended additional assessment and discussed with her the needs of ongoing appropriate general medical care and needs of appropriate risk factor modification of blood pressure and serum lipids in attempt to reduce risk of future atherosclerotic development. I will presently further evaluate her should additional cardiovascular difficulties or concerns arise. Follow-up office visit as needed should additional cardiovascular difficulties or concerns arise    Thank you for allowing me to participate in your patient's care. Please feel free to contact me if you have any questions or concerns. Note: This report was completed utilizing a computerized voice recognition software. Every effort has been made to insure accuracy, however; inadvertent computerized transcription errors may be present. Kirstie John.  Ivonne Nolan, [address] [place] [address] Cardiology

## 2022-08-05 NOTE — TELEPHONE ENCOUNTER
If the gabapentin is working I can increase that. I can also increase the trileptal if that works better. Please ask which one is working for her better. No protocol for us to refill, patient was last seen on 07/14/2022

## 2023-04-26 ENCOUNTER — APPOINTMENT (OUTPATIENT)
Dept: GENERAL RADIOLOGY | Age: 88
End: 2023-04-26
Payer: MEDICARE

## 2023-04-26 ENCOUNTER — HOSPITAL ENCOUNTER (EMERGENCY)
Age: 88
Discharge: HOME | End: 2023-04-26
Attending: EMERGENCY MEDICINE
Payer: MEDICARE

## 2023-04-26 VITALS
DIASTOLIC BLOOD PRESSURE: 66 MMHG | OXYGEN SATURATION: 97 % | WEIGHT: 190 LBS | RESPIRATION RATE: 16 BRPM | HEIGHT: 62 IN | TEMPERATURE: 98.4 F | BODY MASS INDEX: 34.96 KG/M2 | HEART RATE: 67 BPM | SYSTOLIC BLOOD PRESSURE: 162 MMHG

## 2023-04-26 DIAGNOSIS — I95.1 ORTHOSTATIC HYPOTENSION: ICD-10-CM

## 2023-04-26 DIAGNOSIS — R42 DIZZINESS: Primary | ICD-10-CM

## 2023-04-26 DIAGNOSIS — N30.00 ACUTE CYSTITIS WITHOUT HEMATURIA: ICD-10-CM

## 2023-04-26 LAB
ALBUMIN SERPL-MCNC: 3.9 G/DL (ref 3.5–5.2)
ALP SERPL-CCNC: 102 U/L (ref 35–104)
ALT SERPL-CCNC: 5 U/L (ref 0–32)
ANION GAP SERPL CALCULATED.3IONS-SCNC: 9 MMOL/L (ref 7–16)
AST SERPL-CCNC: 20 U/L (ref 0–31)
BACTERIA URNS QL MICRO: ABNORMAL /HPF
BASOPHILS # BLD: 0.02 E9/L (ref 0–0.2)
BASOPHILS NFR BLD: 0.4 % (ref 0–2)
BILIRUB SERPL-MCNC: 0.2 MG/DL (ref 0–1.2)
BILIRUB UR QL STRIP: NEGATIVE
BUN SERPL-MCNC: 21 MG/DL (ref 6–23)
CALCIUM SERPL-MCNC: 9.2 MG/DL (ref 8.6–10.2)
CHLORIDE SERPL-SCNC: 102 MMOL/L (ref 98–107)
CLARITY UR: CLEAR
CO2 SERPL-SCNC: 26 MMOL/L (ref 22–29)
COLOR UR: YELLOW
CREAT SERPL-MCNC: 0.7 MG/DL (ref 0.5–1)
EOSINOPHIL # BLD: 0.08 E9/L (ref 0.05–0.5)
EOSINOPHIL NFR BLD: 1.6 % (ref 0–6)
ERYTHROCYTE [DISTWIDTH] IN BLOOD BY AUTOMATED COUNT: 16.3 FL (ref 11.5–15)
GLUCOSE SERPL-MCNC: 112 MG/DL (ref 74–99)
GLUCOSE UR STRIP-MCNC: NEGATIVE MG/DL
HCT VFR BLD AUTO: 30.5 % (ref 34–48)
HGB BLD-MCNC: 8.9 G/DL (ref 11.5–15.5)
HGB UR QL STRIP: NEGATIVE
IMM GRANULOCYTES # BLD: 0.01 E9/L
IMM GRANULOCYTES NFR BLD: 0.2 % (ref 0–5)
INFLUENZA A BY PCR: NOT DETECTED
INFLUENZA B BY PCR: NOT DETECTED
KETONES UR STRIP-MCNC: NEGATIVE MG/DL
LEUKOCYTE ESTERASE UR QL STRIP: ABNORMAL
LYMPHOCYTES # BLD: 1.36 E9/L (ref 1.5–4)
LYMPHOCYTES NFR BLD: 27.5 % (ref 20–42)
MCH RBC QN AUTO: 22.4 PG (ref 26–35)
MCHC RBC AUTO-ENTMCNC: 29.2 % (ref 32–34.5)
MCV RBC AUTO: 76.6 FL (ref 80–99.9)
MONOCYTES # BLD: 0.44 E9/L (ref 0.1–0.95)
MONOCYTES NFR BLD: 8.9 % (ref 2–12)
NEUTROPHILS # BLD: 3.03 E9/L (ref 1.8–7.3)
NEUTS SEG NFR BLD: 61.4 % (ref 43–80)
NITRITE UR QL STRIP: POSITIVE
PH UR STRIP: 5.5 [PH] (ref 5–9)
PLATELET # BLD AUTO: 300 E9/L (ref 130–450)
PMV BLD AUTO: 9.6 FL (ref 7–12)
POTASSIUM SERPL-SCNC: 4.2 MMOL/L (ref 3.5–5)
PROT SERPL-MCNC: 7.1 G/DL (ref 6.4–8.3)
PROT UR STRIP-MCNC: NEGATIVE MG/DL
RBC # BLD AUTO: 3.98 E12/L (ref 3.5–5.5)
RBC #/AREA URNS HPF: ABNORMAL /HPF (ref 0–2)
SARS-COV-2 RDRP RESP QL NAA+PROBE: NOT DETECTED
SODIUM SERPL-SCNC: 137 MMOL/L (ref 132–146)
SP GR UR STRIP: 1.02 (ref 1–1.03)
TROPONIN, HIGH SENSITIVITY: 31 NG/L (ref 0–9)
TROPONIN, HIGH SENSITIVITY: 32 NG/L (ref 0–9)
UROBILINOGEN UR STRIP-ACNC: 0.2 E.U./DL
WBC # BLD: 4.9 E9/L (ref 4.5–11.5)
WBC #/AREA URNS HPF: ABNORMAL /HPF (ref 0–5)

## 2023-04-26 PROCEDURE — 93005 ELECTROCARDIOGRAM TRACING: CPT | Performed by: EMERGENCY MEDICINE

## 2023-04-26 PROCEDURE — 2580000003 HC RX 258: Performed by: EMERGENCY MEDICINE

## 2023-04-26 PROCEDURE — 80053 COMPREHEN METABOLIC PANEL: CPT

## 2023-04-26 PROCEDURE — 87186 SC STD MICRODIL/AGAR DIL: CPT

## 2023-04-26 PROCEDURE — 99285 EMERGENCY DEPT VISIT HI MDM: CPT

## 2023-04-26 PROCEDURE — 96374 THER/PROPH/DIAG INJ IV PUSH: CPT

## 2023-04-26 PROCEDURE — 84484 ASSAY OF TROPONIN QUANT: CPT

## 2023-04-26 PROCEDURE — 85025 COMPLETE CBC W/AUTO DIFF WBC: CPT

## 2023-04-26 PROCEDURE — 87635 SARS-COV-2 COVID-19 AMP PRB: CPT

## 2023-04-26 PROCEDURE — 81001 URINALYSIS AUTO W/SCOPE: CPT

## 2023-04-26 PROCEDURE — 87502 INFLUENZA DNA AMP PROBE: CPT

## 2023-04-26 PROCEDURE — 6360000002 HC RX W HCPCS: Performed by: EMERGENCY MEDICINE

## 2023-04-26 PROCEDURE — 71046 X-RAY EXAM CHEST 2 VIEWS: CPT

## 2023-04-26 PROCEDURE — 87088 URINE BACTERIA CULTURE: CPT

## 2023-04-26 RX ORDER — 0.9 % SODIUM CHLORIDE 0.9 %
500 INTRAVENOUS SOLUTION INTRAVENOUS ONCE
Status: COMPLETED | OUTPATIENT
Start: 2023-04-26 | End: 2023-04-26

## 2023-04-26 RX ORDER — CEFDINIR 300 MG/1
300 CAPSULE ORAL 2 TIMES DAILY
Qty: 20 CAPSULE | Refills: 0 | Status: SHIPPED | OUTPATIENT
Start: 2023-04-26 | End: 2023-05-06

## 2023-04-26 RX ADMIN — SODIUM CHLORIDE 500 ML: 9 INJECTION, SOLUTION INTRAVENOUS at 18:25

## 2023-04-26 RX ADMIN — CEFTRIAXONE 1000 MG: 1 INJECTION, POWDER, FOR SOLUTION INTRAMUSCULAR; INTRAVENOUS at 19:24

## 2023-04-26 ASSESSMENT — PAIN - FUNCTIONAL ASSESSMENT
PAIN_FUNCTIONAL_ASSESSMENT: NONE - DENIES PAIN
PAIN_FUNCTIONAL_ASSESSMENT: NONE - DENIES PAIN

## 2023-04-26 ASSESSMENT — ENCOUNTER SYMPTOMS
NAUSEA: 0
ABDOMINAL PAIN: 0
VOMITING: 0
SHORTNESS OF BREATH: 0
EYE REDNESS: 0

## 2023-04-26 NOTE — ED NOTES
Ambulated patient in mo, up to bathroom. Patient tolerated well. Patient stated that her dizziness has now resolved. No signs of being unsteady while ambulating. Pt denied any SOB, dizziness, or vision changes while ambulating.       Vahid Antoine RN  04/26/23 1931

## 2023-04-26 NOTE — ED PROVIDER NOTES
and reviewed by me demonstrate:  CBC-hemoglobin noted to be 8.9  CMP-unremarkable  Assessment troponin with unremarkable delta  Urinalysis demonstrate urinary tract infection-patient given IV Rocephin    Imaging reviewed and interpreted by me demonstrates:  Chest x-ray demonstrates no acute process    EKG: This EKG is signed and interpreted by me. Sinus bradycardia rate of 57, left bundle branch block, Sgarbossa criteria negative, WV interval 200 MS,  MS, QTc 467 ms    Chronic conditions:   Past Medical History:   Diagnosis Date    Allergic rhinitis     Chronic back pain     Dizziness and giddiness 02/25/2019    Hypertension     Hypothyroidism     Iron deficiency 02/25/2019    LBBB (left bundle branch block)     Neuropathy     Seizures (HCC)     Vitamin B12 deficiency 02/25/2019       Risk/Disposition: The patient is presenting to the emergency department acutely dizziness. Patient did have labs and imaging which were reviewed. Patient was found to have positive orthostatic vital signs. The patient did have improvement in her dizziness after 1 L IV normal saline. The patient had a urinary tract infection. Patient was given IV Rocephin. The patient be discharged home on [address] [facility] Patient follow-up outpatient. Pt will be d/c and will follow up with her PCP . She is educated on signs and symptoms that require emergent evaluation. Pt is advised to return to the ED if her symptoms change or worsen. If her pain persists, pt may need further evaluation. Pt is agreeable to plan and all questions have been answered at this time.       PATIENT REFERRED TO:  Physician Referral line  [phone]  Call in 1 day        DISCHARGE MEDICATIONS:  Discharge Medication List as of 4/26/2023  7:51 PM        START taking these medications    Details   cefdinir (OMNICEF) 300 MG capsule Take 1 capsule by mouth 2 times daily for 10 days, Disp-20 capsule, R-0Normal                 EMERGENCY DEPARTMENT COURSE    Vitals:

## 2023-04-27 LAB
EKG ATRIAL RATE: 57 BPM
EKG P AXIS: 42 DEGREES
EKG P-R INTERVAL: 200 MS
EKG Q-T INTERVAL: 480 MS
EKG QRS DURATION: 140 MS
EKG QTC CALCULATION (BAZETT): 467 MS
EKG R AXIS: -12 DEGREES
EKG T AXIS: 80 DEGREES
EKG VENTRICULAR RATE: 57 BPM

## 2023-04-27 PROCEDURE — 93010 ELECTROCARDIOGRAM REPORT: CPT | Performed by: INTERNAL MEDICINE

## 2023-04-29 LAB
BACTERIA UR CULT: ABNORMAL
ORGANISM: ABNORMAL

## 2023-05-31 ENCOUNTER — APPOINTMENT (OUTPATIENT)
Dept: GENERAL RADIOLOGY | Age: 88
End: 2023-05-31
Payer: MEDICARE

## 2023-05-31 ENCOUNTER — HOSPITAL ENCOUNTER (EMERGENCY)
Age: 88
Discharge: HOME | End: 2023-05-31
Attending: EMERGENCY MEDICINE
Payer: MEDICARE

## 2023-05-31 VITALS
HEIGHT: 59 IN | DIASTOLIC BLOOD PRESSURE: 61 MMHG | BODY MASS INDEX: 34.27 KG/M2 | HEART RATE: 56 BPM | SYSTOLIC BLOOD PRESSURE: 125 MMHG | RESPIRATION RATE: 16 BRPM | OXYGEN SATURATION: 96 % | WEIGHT: 170 LBS | TEMPERATURE: 98.1 F

## 2023-05-31 DIAGNOSIS — R53.83 FATIGUE, UNSPECIFIED TYPE: ICD-10-CM

## 2023-05-31 DIAGNOSIS — R42 EPISODIC LIGHTHEADEDNESS: ICD-10-CM

## 2023-05-31 DIAGNOSIS — I49.3 FREQUENT PVCS: ICD-10-CM

## 2023-05-31 DIAGNOSIS — D64.9 ANEMIA, UNSPECIFIED TYPE: Primary | ICD-10-CM

## 2023-05-31 LAB
ALBUMIN SERPL-MCNC: 4.3 G/DL (ref 3.5–5.2)
ALP SERPL-CCNC: 99 U/L (ref 35–104)
ALT SERPL-CCNC: 7 U/L (ref 0–32)
ANION GAP SERPL CALCULATED.3IONS-SCNC: 11 MMOL/L (ref 7–16)
ANISOCYTOSIS: ABNORMAL
APTT BLD: 36.3 SEC (ref 24.5–35.1)
AST SERPL-CCNC: 14 U/L (ref 0–31)
BACTERIA URNS QL MICRO: NORMAL /HPF
BASOPHILS # BLD: 0.04 E9/L (ref 0–0.2)
BASOPHILS NFR BLD: 0.5 % (ref 0–2)
BILIRUB SERPL-MCNC: 0.3 MG/DL (ref 0–1.2)
BILIRUB UR QL STRIP: NEGATIVE
BNP BLD-MCNC: 237 PG/ML (ref 0–450)
BUN SERPL-MCNC: 24 MG/DL (ref 6–23)
CALCIUM SERPL-MCNC: 9.5 MG/DL (ref 8.6–10.2)
CHLORIDE SERPL-SCNC: 106 MMOL/L (ref 98–107)
CLARITY UR: CLEAR
CO2 SERPL-SCNC: 25 MMOL/L (ref 22–29)
COLOR UR: YELLOW
CREAT SERPL-MCNC: 0.8 MG/DL (ref 0.5–1)
EKG ATRIAL RATE: 57 BPM
EKG P AXIS: 34 DEGREES
EKG P-R INTERVAL: 182 MS
EKG Q-T INTERVAL: 504 MS
EKG QRS DURATION: 144 MS
EKG QTC CALCULATION (BAZETT): 490 MS
EKG R AXIS: -29 DEGREES
EKG T AXIS: 85 DEGREES
EKG VENTRICULAR RATE: 57 BPM
EOSINOPHIL # BLD: 0.06 E9/L (ref 0.05–0.5)
EOSINOPHIL NFR BLD: 0.7 % (ref 0–6)
ERYTHROCYTE [DISTWIDTH] IN BLOOD BY AUTOMATED COUNT: 17 FL (ref 11.5–15)
GLUCOSE SERPL-MCNC: 132 MG/DL (ref 74–99)
GLUCOSE UR STRIP-MCNC: NEGATIVE MG/DL
HCT VFR BLD AUTO: 32.7 % (ref 34–48)
HGB BLD-MCNC: 9.3 G/DL (ref 11.5–15.5)
HGB UR QL STRIP: NEGATIVE
IMM GRANULOCYTES # BLD: 0.03 E9/L
IMM GRANULOCYTES NFR BLD: 0.4 % (ref 0–5)
INR BLD: 1
KETONES UR STRIP-MCNC: NEGATIVE MG/DL
LACTATE BLDV-SCNC: 1.5 MMOL/L (ref 0.5–2.2)
LEUKOCYTE ESTERASE UR QL STRIP: NEGATIVE
LIPASE: 20 U/L (ref 13–60)
LYMPHOCYTES # BLD: 1.54 E9/L (ref 1.5–4)
LYMPHOCYTES NFR BLD: 18.7 % (ref 20–42)
MAGNESIUM SERPL-MCNC: 2.1 MG/DL (ref 1.6–2.6)
MCH RBC QN AUTO: 21.7 PG (ref 26–35)
MCHC RBC AUTO-ENTMCNC: 28.4 % (ref 32–34.5)
MCV RBC AUTO: 76.4 FL (ref 80–99.9)
MONOCYTES # BLD: 0.46 E9/L (ref 0.1–0.95)
MONOCYTES NFR BLD: 5.6 % (ref 2–12)
NEUTROPHILS # BLD: 6.11 E9/L (ref 1.8–7.3)
NEUTS SEG NFR BLD: 74.1 % (ref 43–80)
NITRITE UR QL STRIP: NEGATIVE
OVALOCYTES: ABNORMAL
PH UR STRIP: 5.5 [PH] (ref 5–9)
PLATELET # BLD AUTO: 282 E9/L (ref 130–450)
PMV BLD AUTO: 10.2 FL (ref 7–12)
POIKILOCYTES: ABNORMAL
POLYCHROMASIA: ABNORMAL
POTASSIUM SERPL-SCNC: 4.3 MMOL/L (ref 3.5–5)
PROT SERPL-MCNC: 7.6 G/DL (ref 6.4–8.3)
PROT UR STRIP-MCNC: NEGATIVE MG/DL
PROTHROMBIN TIME: 11 SEC (ref 9.3–12.4)
RBC # BLD AUTO: 4.28 E12/L (ref 3.5–5.5)
RBC #/AREA URNS HPF: NORMAL /HPF (ref 0–2)
SARS-COV-2 RDRP RESP QL NAA+PROBE: NOT DETECTED
SODIUM SERPL-SCNC: 142 MMOL/L (ref 132–146)
SP GR UR STRIP: 1.02 (ref 1–1.03)
T3FREE SERPL-MCNC: 3.2 PG/ML (ref 2–4.4)
T4 FREE SERPL-MCNC: 1.67 NG/DL (ref 0.93–1.7)
TROPONIN, HIGH SENSITIVITY: 30 NG/L (ref 0–9)
TROPONIN, HIGH SENSITIVITY: 34 NG/L (ref 0–9)
TSH SERPL-MCNC: 0.08 UIU/ML (ref 0.27–4.2)
UROBILINOGEN UR STRIP-ACNC: 0.2 E.U./DL
WBC # BLD: 8.2 E9/L (ref 4.5–11.5)
WBC #/AREA URNS HPF: NORMAL /HPF (ref 0–5)

## 2023-05-31 PROCEDURE — 85610 PROTHROMBIN TIME: CPT

## 2023-05-31 PROCEDURE — 84443 ASSAY THYROID STIM HORMONE: CPT

## 2023-05-31 PROCEDURE — 83690 ASSAY OF LIPASE: CPT

## 2023-05-31 PROCEDURE — 87635 SARS-COV-2 COVID-19 AMP PRB: CPT

## 2023-05-31 PROCEDURE — 93010 ELECTROCARDIOGRAM REPORT: CPT | Performed by: INTERNAL MEDICINE

## 2023-05-31 PROCEDURE — 71046 X-RAY EXAM CHEST 2 VIEWS: CPT

## 2023-05-31 PROCEDURE — 84481 FREE ASSAY (FT-3): CPT

## 2023-05-31 PROCEDURE — 83605 ASSAY OF LACTIC ACID: CPT

## 2023-05-31 PROCEDURE — 84484 ASSAY OF TROPONIN QUANT: CPT

## 2023-05-31 PROCEDURE — 99285 EMERGENCY DEPT VISIT HI MDM: CPT

## 2023-05-31 PROCEDURE — 84439 ASSAY OF FREE THYROXINE: CPT

## 2023-05-31 PROCEDURE — 85025 COMPLETE CBC W/AUTO DIFF WBC: CPT

## 2023-05-31 PROCEDURE — 81001 URINALYSIS AUTO W/SCOPE: CPT

## 2023-05-31 PROCEDURE — 93005 ELECTROCARDIOGRAM TRACING: CPT | Performed by: PHYSICIAN ASSISTANT

## 2023-05-31 PROCEDURE — 83880 ASSAY OF NATRIURETIC PEPTIDE: CPT

## 2023-05-31 PROCEDURE — 80053 COMPREHEN METABOLIC PANEL: CPT

## 2023-05-31 PROCEDURE — 85730 THROMBOPLASTIN TIME PARTIAL: CPT

## 2023-05-31 PROCEDURE — 83735 ASSAY OF MAGNESIUM: CPT

## 2023-05-31 NOTE — ED PROVIDER NOTES
unchanged from previous EKG on 2023   [RH]   1416 Orthostatic vital signs negative. [RH]   1416 Orthostatic vital signs per nursing staff below:  Blood Pressure Lyin/64 - -   Pulse Lyin PER MINUTE - -   Blood Pressure Sittin/60 - -   Pulse Sittin PER MINUTE - -   Blood Pressure Standin/69 - -   Pulse Standin PER MINUTE   [RH]      ED Course User Index  [RH] [address], DO       Medical Decision Making  Amount and/or Complexity of Data Reviewed  External Data Reviewed: labs. Labs: ordered. Decision-making details documented in ED Course. Radiology: ordered and independent interpretation performed. Decision-making details documented in ED Course. ECG/medicine tests: ordered and independent interpretation performed. Decision-making details documented in ED Course. This is an 80-year-old female presenting to the emergency department for fatigue, intermittent lightheadedness. No syncope, no chest pain or shortness of breath. Family concerned about anemia because family reported that her hemoglobin was 7 at a recent ED visit, per chart review it was above 8 at that time, and trending upwards today with hemoglobin 9.3. No leukocytosis or thrombocytopenia, no coagulopathy with normal INR and PTT. No reported melena or hematochezia. Patient does have microcytic anemia, recommended increasing iron in her diet, follow-up with PCP for possible further workup/treatment. Troponin mildly elevated but stable on repeat. No acute ischemic changes on EKG, however frequent PVCs that patient is asymptomatic from. Electrolytes within normal limits, normal magnesium at 2.1, normal INR 1.0.  TSH is low at 0.079, however free T4 was normal at 1.67. Recommended outpatient follow-up with PCP for this. Urinalysis with no evidence of infection. CMP with normal kidney function electrolytes, normal LFTs and bilirubin. Lactic acid normal 1.5. BNP low and reassuring at 237.

## 2023-05-31 NOTE — ED TRIAGE NOTES
Department of Emergency Medicine    FIRST PROVIDER TRIAGE NOTE             Independent MLP           5/31/23  10:12 AM EDT    Date of Encounter: 5/31/23   MRN: [id number]    Vitals:    05/31/23 1002   BP: 133/89   Pulse: (!) 117   Resp: 16   Temp: 98.1 °F (36.7 °C)   TempSrc: Oral   SpO2: 99%   Weight: 170 lb (77.1 kg)   Height: 4' 11\" (1.499 m)      HPI: Sarah Barrett is a 80 y.o. female who presents to the ED for Fatigue     ROS: Negative for cp or sob. Physical Exam:   Gen Appearance/Constitutional: alert  CV: tachycardia     Initial Plan of Care: All treatment areas with department are currently occupied. Plan to order/Initiate the following while awaiting opening in ED: EKG.     Initial Plan of Care: Initiate Treatment-Testing, Proceed toTreatment Area When Bed Available for ED Attending/MLP to Continue Care    Electronically signed by Otoniel Mathews PA-C   DD: 5/31/23

## 2023-09-27 LAB
BACTERIA URNS QL MICRO: ABNORMAL
BILIRUB UR QL STRIP: NEGATIVE
CLARITY UR: CLEAR
COLOR UR: YELLOW
EPI CELLS #/AREA URNS HPF: ABNORMAL /HPF
GLUCOSE UR STRIP-MCNC: NEGATIVE MG/DL
HGB UR QL STRIP.AUTO: NEGATIVE
KETONES UR STRIP-MCNC: NEGATIVE MG/DL
LEUKOCYTE ESTERASE UR QL STRIP: NEGATIVE
MUCOUS THREADS URNS QL MICRO: PRESENT
NITRITE UR QL STRIP: NEGATIVE
PH UR STRIP: 5.5 [PH] (ref 5–9)
PROT UR STRIP-MCNC: ABNORMAL MG/DL
RBC #/AREA URNS HPF: ABNORMAL /HPF
SP GR UR STRIP: >1.03 (ref 1–1.03)
UROBILINOGEN UR STRIP-ACNC: 0.2 EU/DL (ref 0–1)
WBC #/AREA URNS HPF: ABNORMAL /HPF

## 2023-09-29 LAB
MICROORGANISM SPEC CULT: ABNORMAL
MICROORGANISM SPEC CULT: ABNORMAL
SPECIMEN DESCRIPTION: ABNORMAL

## 2023-10-14 ENCOUNTER — APPOINTMENT (OUTPATIENT)
Dept: CT IMAGING | Age: 88
End: 2023-10-14
Attending: EMERGENCY MEDICINE
Payer: MEDICARE

## 2023-10-14 ENCOUNTER — APPOINTMENT (OUTPATIENT)
Dept: GENERAL RADIOLOGY | Age: 88
End: 2023-10-14
Payer: MEDICARE

## 2023-10-14 ENCOUNTER — HOSPITAL ENCOUNTER (OUTPATIENT)
Age: 88
Setting detail: OBSERVATION
Discharge: HOME | End: 2023-10-17
Attending: EMERGENCY MEDICINE | Admitting: FAMILY MEDICINE
Payer: MEDICARE

## 2023-10-14 DIAGNOSIS — R42 LIGHTHEADEDNESS: ICD-10-CM

## 2023-10-14 DIAGNOSIS — R55 NEAR SYNCOPE: Primary | ICD-10-CM

## 2023-10-14 DIAGNOSIS — D64.9 ANEMIA, UNSPECIFIED TYPE: ICD-10-CM

## 2023-10-14 LAB
ALBUMIN SERPL-MCNC: 3.8 G/DL (ref 3.5–5.2)
ALP SERPL-CCNC: 92 U/L (ref 35–104)
ALT SERPL-CCNC: 9 U/L (ref 0–32)
ANION GAP SERPL CALCULATED.3IONS-SCNC: 10 MMOL/L (ref 7–16)
AST SERPL-CCNC: 19 U/L (ref 0–31)
BASOPHILS # BLD: 0.05 K/UL (ref 0–0.2)
BASOPHILS NFR BLD: 1 % (ref 0–2)
BILIRUB SERPL-MCNC: <0.2 MG/DL (ref 0–1.2)
BUN SERPL-MCNC: 18 MG/DL (ref 6–23)
CALCIUM SERPL-MCNC: 8.8 MG/DL (ref 8.6–10.2)
CHLORIDE SERPL-SCNC: 109 MMOL/L (ref 98–107)
CO2 SERPL-SCNC: 23 MMOL/L (ref 22–29)
CREAT SERPL-MCNC: 0.6 MG/DL (ref 0.5–1)
EOSINOPHIL # BLD: 0 K/UL (ref 0.05–0.5)
EOSINOPHILS RELATIVE PERCENT: 0 % (ref 0–6)
ERYTHROCYTE [DISTWIDTH] IN BLOOD BY AUTOMATED COUNT: 17.2 % (ref 11.5–15)
GFR SERPL CREATININE-BSD FRML MDRD: >60 ML/MIN/1.73M2
GLUCOSE SERPL-MCNC: 105 MG/DL (ref 74–99)
HCT VFR BLD AUTO: 28.9 % (ref 34–48)
HGB BLD-MCNC: 8.1 G/DL (ref 11.5–15.5)
LACTATE BLDV-SCNC: 1.2 MMOL/L (ref 0.5–1.9)
LYMPHOCYTES NFR BLD: 0.99 K/UL (ref 1.5–4)
LYMPHOCYTES RELATIVE PERCENT: 17 % (ref 20–42)
MCH RBC QN AUTO: 21.5 PG (ref 26–35)
MCHC RBC AUTO-ENTMCNC: 28 G/DL (ref 32–34.5)
MCV RBC AUTO: 76.9 FL (ref 80–99.9)
MONOCYTES NFR BLD: 0.15 K/UL (ref 0.1–0.95)
MONOCYTES NFR BLD: 3 % (ref 2–12)
NEUTROPHILS NFR BLD: 79 % (ref 43–80)
NEUTS SEG NFR BLD: 4.51 K/UL (ref 1.8–7.3)
PLATELET # BLD AUTO: 268 K/UL (ref 130–450)
PMV BLD AUTO: 9.9 FL (ref 7–12)
POTASSIUM SERPL-SCNC: 4 MMOL/L (ref 3.5–5)
PROT SERPL-MCNC: 6.7 G/DL (ref 6.4–8.3)
RBC # BLD AUTO: 3.76 M/UL (ref 3.5–5.5)
RBC # BLD: ABNORMAL 10*6/UL
SODIUM SERPL-SCNC: 142 MMOL/L (ref 132–146)
TROPONIN I SERPL HS-MCNC: 26 NG/L (ref 0–9)
TROPONIN I SERPL HS-MCNC: 27 NG/L (ref 0–9)
TSH SERPL DL<=0.05 MIU/L-ACNC: 0.02 UIU/ML (ref 0.27–4.2)
WBC OTHER # BLD: 5.7 K/UL (ref 4.5–11.5)

## 2023-10-14 PROCEDURE — 85025 COMPLETE CBC W/AUTO DIFF WBC: CPT

## 2023-10-14 PROCEDURE — 71275 CT ANGIOGRAPHY CHEST: CPT

## 2023-10-14 PROCEDURE — 83605 ASSAY OF LACTIC ACID: CPT

## 2023-10-14 PROCEDURE — 71046 X-RAY EXAM CHEST 2 VIEWS: CPT

## 2023-10-14 PROCEDURE — 96361 HYDRATE IV INFUSION ADD-ON: CPT

## 2023-10-14 PROCEDURE — 93005 ELECTROCARDIOGRAM TRACING: CPT | Performed by: EMERGENCY MEDICINE

## 2023-10-14 PROCEDURE — 84484 ASSAY OF TROPONIN QUANT: CPT

## 2023-10-14 PROCEDURE — 87086 URINE CULTURE/COLONY COUNT: CPT

## 2023-10-14 PROCEDURE — 70498 CT ANGIOGRAPHY NECK: CPT

## 2023-10-14 PROCEDURE — G0378 HOSPITAL OBSERVATION PER HR: HCPCS

## 2023-10-14 PROCEDURE — 70450 CT HEAD/BRAIN W/O DYE: CPT

## 2023-10-14 PROCEDURE — 74177 CT ABD & PELVIS W/CONTRAST: CPT

## 2023-10-14 PROCEDURE — 2580000003 HC RX 258: Performed by: FAMILY MEDICINE

## 2023-10-14 PROCEDURE — 84443 ASSAY THYROID STIM HORMONE: CPT

## 2023-10-14 PROCEDURE — 81001 URINALYSIS AUTO W/SCOPE: CPT

## 2023-10-14 PROCEDURE — 99285 EMERGENCY DEPT VISIT HI MDM: CPT

## 2023-10-14 PROCEDURE — 6360000004 HC RX CONTRAST MEDICATION: Performed by: RADIOLOGY

## 2023-10-14 PROCEDURE — 70496 CT ANGIOGRAPHY HEAD: CPT

## 2023-10-14 PROCEDURE — 83880 ASSAY OF NATRIURETIC PEPTIDE: CPT

## 2023-10-14 PROCEDURE — 80053 COMPREHEN METABOLIC PANEL: CPT

## 2023-10-14 PROCEDURE — 2580000003 HC RX 258: Performed by: EMERGENCY MEDICINE

## 2023-10-14 RX ORDER — SODIUM CHLORIDE 0.9 % (FLUSH) 0.9 %
5-40 SYRINGE (ML) INJECTION EVERY 12 HOURS SCHEDULED
Status: DISCONTINUED | OUTPATIENT
Start: 2023-10-14 | End: 2023-10-17 | Stop reason: HOSPADM

## 2023-10-14 RX ORDER — ACETAMINOPHEN 650 MG/1
650 SUPPOSITORY RECTAL EVERY 6 HOURS PRN
Status: DISCONTINUED | OUTPATIENT
Start: 2023-10-14 | End: 2023-10-17 | Stop reason: HOSPADM

## 2023-10-14 RX ORDER — ONDANSETRON 2 MG/ML
4 INJECTION INTRAMUSCULAR; INTRAVENOUS EVERY 6 HOURS PRN
Status: DISCONTINUED | OUTPATIENT
Start: 2023-10-14 | End: 2023-10-17 | Stop reason: HOSPADM

## 2023-10-14 RX ORDER — ACETAMINOPHEN 325 MG/1
650 TABLET ORAL EVERY 6 HOURS PRN
Status: DISCONTINUED | OUTPATIENT
Start: 2023-10-14 | End: 2023-10-17 | Stop reason: HOSPADM

## 2023-10-14 RX ORDER — SODIUM CHLORIDE 9 MG/ML
INJECTION, SOLUTION INTRAVENOUS PRN
Status: DISCONTINUED | OUTPATIENT
Start: 2023-10-14 | End: 2023-10-17 | Stop reason: HOSPADM

## 2023-10-14 RX ORDER — 0.9 % SODIUM CHLORIDE 0.9 %
1000 INTRAVENOUS SOLUTION INTRAVENOUS ONCE
Status: COMPLETED | OUTPATIENT
Start: 2023-10-14 | End: 2023-10-14

## 2023-10-14 RX ORDER — ENOXAPARIN SODIUM 100 MG/ML
40 INJECTION SUBCUTANEOUS DAILY
Status: DISCONTINUED | OUTPATIENT
Start: 2023-10-15 | End: 2023-10-14

## 2023-10-14 RX ORDER — ONDANSETRON 4 MG/1
4 TABLET, ORALLY DISINTEGRATING ORAL EVERY 8 HOURS PRN
Status: DISCONTINUED | OUTPATIENT
Start: 2023-10-14 | End: 2023-10-17 | Stop reason: HOSPADM

## 2023-10-14 RX ORDER — OXCARBAZEPINE 300 MG/1
600 TABLET, FILM COATED ORAL EVERY 6 HOURS SCHEDULED
Status: DISCONTINUED | OUTPATIENT
Start: 2023-10-15 | End: 2023-10-17 | Stop reason: HOSPADM

## 2023-10-14 RX ORDER — HYDRALAZINE HYDROCHLORIDE 20 MG/ML
5 INJECTION INTRAMUSCULAR; INTRAVENOUS EVERY 6 HOURS PRN
Status: DISCONTINUED | OUTPATIENT
Start: 2023-10-14 | End: 2023-10-17 | Stop reason: HOSPADM

## 2023-10-14 RX ORDER — SODIUM CHLORIDE 0.9 % (FLUSH) 0.9 %
5-40 SYRINGE (ML) INJECTION PRN
Status: DISCONTINUED | OUTPATIENT
Start: 2023-10-14 | End: 2023-10-17 | Stop reason: HOSPADM

## 2023-10-14 RX ORDER — POLYETHYLENE GLYCOL 3350 17 G/17G
17 POWDER, FOR SOLUTION ORAL DAILY PRN
Status: DISCONTINUED | OUTPATIENT
Start: 2023-10-14 | End: 2023-10-17 | Stop reason: HOSPADM

## 2023-10-14 RX ORDER — SODIUM CHLORIDE 9 MG/ML
INJECTION, SOLUTION INTRAVENOUS CONTINUOUS
Status: ACTIVE | OUTPATIENT
Start: 2023-10-14 | End: 2023-10-15

## 2023-10-14 RX ADMIN — SODIUM CHLORIDE 1000 ML: 9 INJECTION, SOLUTION INTRAVENOUS at 15:50

## 2023-10-14 RX ADMIN — SODIUM CHLORIDE: 9 INJECTION, SOLUTION INTRAVENOUS at 23:03

## 2023-10-14 RX ADMIN — IOPAMIDOL 150 ML: 755 INJECTION, SOLUTION INTRAVENOUS at 17:57

## 2023-10-14 ASSESSMENT — PAIN - FUNCTIONAL ASSESSMENT: PAIN_FUNCTIONAL_ASSESSMENT: NONE - DENIES PAIN

## 2023-10-14 NOTE — ED PROVIDER NOTES
change    Clinical Impression: Sinus rhythm, no acute ischemic changes    Comparison to Old EKG  Stable from prior EKG          Problems Addressed:  Anemia, unspecified type: chronic illness or injury  Lightheadedness: acute illness or injury  Near syncope: acute illness or injury    Amount and/or Complexity of Data Reviewed  External Data Reviewed: ECG and notes. Labs: ordered. Decision-making details documented in ED Course. Radiology: ordered and independent interpretation performed. Decision-making details documented in ED Course. ECG/medicine tests: ordered and independent interpretation performed. Decision-making details documented in ED Course. Risk  Prescription drug management. CONSULTS:   Internal Medicine      PROCEDURES   Unless otherwise noted below, none       CRITICAL CARE TIME (.cct)   none        I am the Primary Clinician of Record. FINAL IMPRESSION      1. Near syncope    2. Lightheadedness    3. Anemia, unspecified type          DISPOSITION/PLAN     DISPOSITION        PATIENT REFERRED TO:  No follow-up provider specified.     DISCHARGE MEDICATIONS:  New Prescriptions    No medications on file       DISCONTINUED MEDICATIONS:  Discontinued Medications    No medications on file              (Please note that portions of this note were completed with a voice recognition program.  Efforts were made to edit the dictations but occasionally words are mis-transcribed.)    Harshad Galdamez MD (electronically signed)            Chris Grimes MD  10/14/23 [id number]

## 2023-10-15 LAB
ANION GAP SERPL CALCULATED.3IONS-SCNC: 10 MMOL/L (ref 7–16)
BASOPHILS # BLD: 0.02 K/UL (ref 0–0.2)
BASOPHILS NFR BLD: 0 % (ref 0–2)
BILIRUB UR QL STRIP: NEGATIVE
BNP SERPL-MCNC: 669 PG/ML (ref 0–450)
BUN SERPL-MCNC: 11 MG/DL (ref 6–23)
CALCIUM SERPL-MCNC: 8.5 MG/DL (ref 8.6–10.2)
CHLORIDE SERPL-SCNC: 107 MMOL/L (ref 98–107)
CLARITY UR: CLEAR
CO2 SERPL-SCNC: 23 MMOL/L (ref 22–29)
COLOR UR: YELLOW
CREAT SERPL-MCNC: 0.6 MG/DL (ref 0.5–1)
EKG ATRIAL RATE: 53 BPM
EKG P AXIS: 24 DEGREES
EKG P-R INTERVAL: 188 MS
EKG Q-T INTERVAL: 496 MS
EKG QRS DURATION: 146 MS
EKG QTC CALCULATION (BAZETT): 465 MS
EKG R AXIS: -24 DEGREES
EKG T AXIS: 98 DEGREES
EKG VENTRICULAR RATE: 53 BPM
EOSINOPHIL # BLD: 0.06 K/UL (ref 0.05–0.5)
EOSINOPHILS RELATIVE PERCENT: 1 % (ref 0–6)
ERYTHROCYTE [DISTWIDTH] IN BLOOD BY AUTOMATED COUNT: 17.2 % (ref 11.5–15)
FOLATE SERPL-MCNC: >20 NG/ML (ref 4.8–24.2)
GFR SERPL CREATININE-BSD FRML MDRD: >60 ML/MIN/1.73M2
GLUCOSE SERPL-MCNC: 112 MG/DL (ref 74–99)
GLUCOSE UR STRIP-MCNC: NEGATIVE MG/DL
HCT VFR BLD AUTO: 26.5 % (ref 34–48)
HGB BLD-MCNC: 7.5 G/DL (ref 11.5–15.5)
HGB UR QL STRIP.AUTO: NEGATIVE
IMM GRANULOCYTES # BLD AUTO: <0.03 K/UL (ref 0–0.58)
IMM GRANULOCYTES NFR BLD: 0 % (ref 0–5)
IRON SATN MFR SERPL: 7 % (ref 15–50)
IRON SERPL-MCNC: 26 UG/DL (ref 37–145)
KETONES UR STRIP-MCNC: NEGATIVE MG/DL
LEUKOCYTE ESTERASE UR QL STRIP: NEGATIVE
LYMPHOCYTES NFR BLD: 1.04 K/UL (ref 1.5–4)
LYMPHOCYTES RELATIVE PERCENT: 22 % (ref 20–42)
MCH RBC QN AUTO: 21.7 PG (ref 26–35)
MCHC RBC AUTO-ENTMCNC: 28.3 G/DL (ref 32–34.5)
MCV RBC AUTO: 76.8 FL (ref 80–99.9)
MONOCYTES NFR BLD: 0.43 K/UL (ref 0.1–0.95)
MONOCYTES NFR BLD: 9 % (ref 2–12)
NEUTROPHILS NFR BLD: 67 % (ref 43–80)
NEUTS SEG NFR BLD: 3.22 K/UL (ref 1.8–7.3)
NITRITE UR QL STRIP: NEGATIVE
PH UR STRIP: 5.5 [PH] (ref 5–9)
PLATELET # BLD AUTO: 256 K/UL (ref 130–450)
PMV BLD AUTO: 9.9 FL (ref 7–12)
POTASSIUM SERPL-SCNC: 3.9 MMOL/L (ref 3.5–5)
PROT UR STRIP-MCNC: NEGATIVE MG/DL
RBC # BLD AUTO: 3.45 M/UL (ref 3.5–5.5)
RBC # BLD: ABNORMAL 10*6/UL
RBC #/AREA URNS HPF: NORMAL /HPF
SODIUM SERPL-SCNC: 140 MMOL/L (ref 132–146)
SP GR UR STRIP: 1.01 (ref 1–1.03)
T4 FREE SERPL-MCNC: 1.2 NG/DL (ref 0.9–1.7)
TIBC SERPL-MCNC: 355 UG/DL (ref 250–450)
UROBILINOGEN UR STRIP-ACNC: 0.2 EU/DL (ref 0–1)
VIT B12 SERPL-MCNC: 209 PG/ML (ref 211–946)
WBC #/AREA URNS HPF: NORMAL /HPF
WBC OTHER # BLD: 4.8 K/UL (ref 4.5–11.5)

## 2023-10-15 PROCEDURE — 6370000000 HC RX 637 (ALT 250 FOR IP): Performed by: INTERNAL MEDICINE

## 2023-10-15 PROCEDURE — G0378 HOSPITAL OBSERVATION PER HR: HCPCS

## 2023-10-15 PROCEDURE — 6370000000 HC RX 637 (ALT 250 FOR IP): Performed by: FAMILY MEDICINE

## 2023-10-15 PROCEDURE — 6360000002 HC RX W HCPCS: Performed by: NURSE PRACTITIONER

## 2023-10-15 PROCEDURE — 85025 COMPLETE CBC W/AUTO DIFF WBC: CPT

## 2023-10-15 PROCEDURE — 93010 ELECTROCARDIOGRAM REPORT: CPT | Performed by: INTERNAL MEDICINE

## 2023-10-15 PROCEDURE — 6370000000 HC RX 637 (ALT 250 FOR IP): Performed by: NURSE PRACTITIONER

## 2023-10-15 PROCEDURE — 2580000003 HC RX 258: Performed by: FAMILY MEDICINE

## 2023-10-15 PROCEDURE — 97535 SELF CARE MNGMENT TRAINING: CPT

## 2023-10-15 PROCEDURE — 96366 THER/PROPH/DIAG IV INF ADDON: CPT

## 2023-10-15 PROCEDURE — 2580000003 HC RX 258: Performed by: NURSE PRACTITIONER

## 2023-10-15 PROCEDURE — 96365 THER/PROPH/DIAG IV INF INIT: CPT

## 2023-10-15 PROCEDURE — APPSS60 APP SPLIT SHARED TIME 46-60 MINUTES: Performed by: CLINICAL NURSE SPECIALIST

## 2023-10-15 PROCEDURE — 99223 1ST HOSP IP/OBS HIGH 75: CPT | Performed by: INTERNAL MEDICINE

## 2023-10-15 PROCEDURE — 97165 OT EVAL LOW COMPLEX 30 MIN: CPT

## 2023-10-15 PROCEDURE — 83550 IRON BINDING TEST: CPT

## 2023-10-15 PROCEDURE — 82746 ASSAY OF FOLIC ACID SERUM: CPT

## 2023-10-15 PROCEDURE — 2500000003 HC RX 250 WO HCPCS: Performed by: NURSE PRACTITIONER

## 2023-10-15 PROCEDURE — 96372 THER/PROPH/DIAG INJ SC/IM: CPT

## 2023-10-15 PROCEDURE — 36415 COLL VENOUS BLD VENIPUNCTURE: CPT

## 2023-10-15 PROCEDURE — 84439 ASSAY OF FREE THYROXINE: CPT

## 2023-10-15 PROCEDURE — 83540 ASSAY OF IRON: CPT

## 2023-10-15 PROCEDURE — 82607 VITAMIN B-12: CPT

## 2023-10-15 PROCEDURE — 80048 BASIC METABOLIC PNL TOTAL CA: CPT

## 2023-10-15 RX ORDER — LORAZEPAM 2 MG/ML
1 INJECTION INTRAMUSCULAR
Status: COMPLETED | OUTPATIENT
Start: 2023-10-15 | End: 2023-10-16

## 2023-10-15 RX ORDER — ENOXAPARIN SODIUM 100 MG/ML
40 INJECTION SUBCUTANEOUS DAILY
Status: DISCONTINUED | OUTPATIENT
Start: 2023-10-15 | End: 2023-10-17 | Stop reason: HOSPADM

## 2023-10-15 RX ORDER — FERROUS SULFATE 325(65) MG
325 TABLET ORAL
Status: DISCONTINUED | OUTPATIENT
Start: 2023-10-17 | End: 2023-10-17 | Stop reason: HOSPADM

## 2023-10-15 RX ORDER — AMLODIPINE BESYLATE 5 MG/1
5 TABLET ORAL DAILY
Status: DISCONTINUED | OUTPATIENT
Start: 2023-10-15 | End: 2023-10-17 | Stop reason: HOSPADM

## 2023-10-15 RX ORDER — CYANOCOBALAMIN 1000 UG/ML
1000 INJECTION, SOLUTION INTRAMUSCULAR; SUBCUTANEOUS ONCE
Status: COMPLETED | OUTPATIENT
Start: 2023-10-15 | End: 2023-10-15

## 2023-10-15 RX ORDER — AMLODIPINE BESYLATE 5 MG/1
5 TABLET ORAL DAILY
Qty: 30 TABLET | Refills: 3 | Status: SHIPPED | OUTPATIENT
Start: 2023-10-16

## 2023-10-15 RX ORDER — FERROUS SULFATE 325(65) MG
325 TABLET ORAL
Qty: 30 TABLET | Refills: 3 | Status: SHIPPED | OUTPATIENT
Start: 2023-10-17

## 2023-10-15 RX ADMIN — SODIUM CHLORIDE 25 MG: 9 INJECTION, SOLUTION INTRAVENOUS at 10:33

## 2023-10-15 RX ADMIN — ACETAMINOPHEN 650 MG: 325 TABLET ORAL at 09:34

## 2023-10-15 RX ADMIN — OXCARBAZEPINE 600 MG: 300 TABLET, FILM COATED ORAL at 17:47

## 2023-10-15 RX ADMIN — MICONAZOLE NITRATE: 20.6 POWDER TOPICAL at 21:06

## 2023-10-15 RX ADMIN — SODIUM CHLORIDE, PRESERVATIVE FREE 10 ML: 5 INJECTION INTRAVENOUS at 21:05

## 2023-10-15 RX ADMIN — OXCARBAZEPINE 600 MG: 300 TABLET, FILM COATED ORAL at 23:08

## 2023-10-15 RX ADMIN — OXCARBAZEPINE 600 MG: 300 TABLET, FILM COATED ORAL at 11:33

## 2023-10-15 RX ADMIN — SODIUM CHLORIDE 100 MG: 9 INJECTION, SOLUTION INTRAVENOUS at 11:35

## 2023-10-15 RX ADMIN — METOPROLOL TARTRATE 25 MG: 25 TABLET, FILM COATED ORAL at 21:05

## 2023-10-15 RX ADMIN — AMLODIPINE BESYLATE 5 MG: 5 TABLET ORAL at 09:30

## 2023-10-15 RX ADMIN — SODIUM CHLORIDE, PRESERVATIVE FREE 10 ML: 5 INJECTION INTRAVENOUS at 09:30

## 2023-10-15 RX ADMIN — CYANOCOBALAMIN 1000 MCG: 1000 INJECTION, SOLUTION INTRAMUSCULAR; SUBCUTANEOUS at 14:23

## 2023-10-15 RX ADMIN — METOPROLOL TARTRATE 25 MG: 25 TABLET, FILM COATED ORAL at 09:31

## 2023-10-15 RX ADMIN — LEVOTHYROXINE SODIUM 150 MCG: 0.1 TABLET ORAL at 07:11

## 2023-10-15 RX ADMIN — SODIUM CHLORIDE, PRESERVATIVE FREE 10 ML: 5 INJECTION INTRAVENOUS at 00:23

## 2023-10-15 RX ADMIN — PETROLATUM: 420 OINTMENT TOPICAL at 21:06

## 2023-10-15 RX ADMIN — MICONAZOLE NITRATE: 20.6 POWDER TOPICAL at 14:23

## 2023-10-15 RX ADMIN — OXCARBAZEPINE 600 MG: 300 TABLET, FILM COATED ORAL at 07:11

## 2023-10-15 RX ADMIN — ENOXAPARIN SODIUM 40 MG: 100 INJECTION SUBCUTANEOUS at 09:31

## 2023-10-15 RX ADMIN — OXCARBAZEPINE 600 MG: 300 TABLET, FILM COATED ORAL at 00:21

## 2023-10-15 ASSESSMENT — PATIENT HEALTH QUESTIONNAIRE - PHQ9
SUM OF ALL RESPONSES TO PHQ QUESTIONS 1-9: 0
SUM OF ALL RESPONSES TO PHQ QUESTIONS 1-9: 0
1. LITTLE INTEREST OR PLEASURE IN DOING THINGS: 0
SUM OF ALL RESPONSES TO PHQ QUESTIONS 1-9: 0
SUM OF ALL RESPONSES TO PHQ QUESTIONS 1-9: 0
SUM OF ALL RESPONSES TO PHQ9 QUESTIONS 1 & 2: 0
2. FEELING DOWN, DEPRESSED OR HOPELESS: 0

## 2023-10-15 ASSESSMENT — PAIN DESCRIPTION - LOCATION: LOCATION: HEAD

## 2023-10-15 ASSESSMENT — PAIN SCALES - GENERAL: PAINLEVEL_OUTOF10: 5

## 2023-10-15 ASSESSMENT — LIFESTYLE VARIABLES
HOW OFTEN DO YOU HAVE A DRINK CONTAINING ALCOHOL: NEVER
HOW MANY STANDARD DRINKS CONTAINING ALCOHOL DO YOU HAVE ON A TYPICAL DAY: PATIENT DOES NOT DRINK

## 2023-10-15 NOTE — H&P
Hospital Medicine History & Physical      PCP: No primary care provider on file. Date of Admission: 10/14/2023    Date of Service: Pt seen/examined on 10/14/23 and Admitted to Inpatient with expected LOS greater than two midnights due to medical therapy. Placed in Observation. 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 She would like she was going to pass out also reported some nausea. In the ER all images were obtained With CT head CT head and neck negative for any acute findings except for a 2.3 x 0.8 cm left temporal lesion concerning for likely meningioma present on previous MRI dating back to 3/9/2019. Given reported nausea CT abdomen was obtained a CT angiogram of the chest with triple negative for any acute findings. Troponin 2726. Lactic acid 1.2. EKG was sinus bradycardia left bundle branch block present on old EKGs. Patient mentions she feels some heaviness and discomfort on the left side of her head. She was not aware that she had a meningioma. She mention she does not feel right as she gets lightheaded even with getting up sometimes. Symptoms were worse yesterday though she had previously off-and-on but not this uncomfortable as she felt like she was going to pass out.     Past Medical History:          Diagnosis Date    Allergic rhinitis     Chronic back pain     Dizziness and giddiness 02/25/2019    Hypertension     Hypothyroidism     Iron deficiency 02/25/2019    LBBB (left bundle branch block)     Neuropathy     Seizures (HCC)     Vitamin B12 deficiency 02/25/2019       Past Surgical History:          Procedure Laterality Date    APPENDECTOMY      BACK SURGERY  MORE 10 YRS     LUMBAR    BREAST SURGERY      MORE 10 YRS

## 2023-10-15 NOTE — CONSULTS
The above documentation has been prepared under my direction and personally reviewed by me in its entirety. I confirm that the note above accurately reflects all work, treatment, procedures, and medical decision making performed by me. The patient's history was independently obtained. The patient was independently examined. Electrocardiogram, prior and present cardiovascular assessment, and laboratory studies were reviewed. The patient is an 70-year-old white female known to [facility] cardiology with primary cardiovascular care provided by myself. She has a longstanding history of somewhat vague and nonspecific symptoms of a headache with positional changes of her head in the absence of additional focal neurologic symptomatology and in the absence of additional manifestations of near syncope or loss of consciousness. She has a known history of documented hypertension, a chronically noted left bundle branch block, prior vasodepressive syncope and moderate obesity. Has previously undergone objective testing most recently that of an echocardiogram in October, 2018 demonstrating evidence of a normal-sized left ventricular chamber with mild concentric left ventricular hypertrophy with normal left ventricular systolic function and no significant valvular abnormalities. She additionally has a chronic left facial droop from previous trigeminal surgical intervention in addition to that of a previously documented meningioma. She presents with symptoms of an ill-defined headache predominantly involving her left parietal region as well as symptoms of ataxia upon specific questioning with no symptoms of a cardiovascular nature and again upon specific questioning no chest discomfort, dyspnea, palpitations, lightheadedness or near syncope.   On this basis, she presented to the emergency room where a resting electrocardiogram reviewed at time of evaluation demonstrated evidence of sinus bradycardia with left axis
syncope. On this basis, she presented to the emergency room where a resting electrocardiogram reviewed at time of evaluation demonstrated evidence of sinus bradycardia with left axis deviation and a left bundle branch block chest x-ray demonstrating borderline inspiratory effort with mild cardiomegaly and no evidence of significant or significant changes with a contrast CT angiogram of the chest excluding the presence of a pulmonary embolism and again demonstrating cardiomegaly associated with that of atelectasis. Additional laboratory studies demonstrated no evidence of significant metabolic derangements with mild elevation of high-sensitivity troponin levels in a pattern not suggestive of an acute coronary syndrome and a proBNP level of 670 pg/mL. Additional evidence of anemia as well as that of suppression of TSH levels progressive from her earlier determination face of thyroid hormone supplementation. Subsequent to hospitalization, she has received intravenous fluids with no evidence of orthostatic blood pressure changes documented nor that of cardiac arrhythmias and repeat laboratory assessment demonstrating a progressive anemia. At the time of evaluation, the patient's medications and allergies were reviewed as well as that of her past medical history and review of systems as documented. On examination, she appears chronically ill and in no discomfort nor distress. She is hemodynamically stable with vital signs as documented. Her left facial droop is noted. Jugular venous pressure is normal with no identified carotid bruits. Lung fields are clear to auscultation. Cardiac examination is notable for a regular rate and rhythm with no gallop rhythm or cardiac murmur. Abdominal obesity is present with a benign abdominal examination no peripheral edema identified. Diagnostic Assessment and Plan:  On a clinical basis, the patient presents with somewhat ill-defined symptoms not suggestive of a

## 2023-10-15 NOTE — PROGRESS NOTES
[place] [address]                                                  Patient Name: Ashish Arciniega    MRN: [id number]    : 6/10/1934    Room: [address] [place] [address]      Evaluating OT: Delvin Huggins OTR/L; [id number]      Referring Provider: Renee Martínez MD    Specific Provider Orders/Date: OT Eval and Treat 10/14/23      Diagnosis: Near syncope   Lightheadedness    Surgery: none this admission     Pertinent Medical History:  has a past medical history of Allergic rhinitis, Chronic back pain, Dizziness and giddiness, Hypertension, Hypothyroidism, Iron deficiency, LBBB (left bundle branch block), Neuropathy, Seizures ([address]), and Vitamin B12 deficiency.       Reason for Admission: dizziness, weakness, nausea  CT of head = extra-axial calcified lesion along the petrous apex of the left temporal bone, most likely a meningioma    Recommended Adaptive Equipment:  TBD pending progression - shower chair     Precautions:  Fall Risk, Bed Alarm, Insight     Assessment of current deficits:    [x] Functional mobility  [x]ADLs  [x] Strength               [x]Cognition    [x] Functional transfers   [x] IADLs         [x] Safety Awareness   [x]Endurance    [x] Fine Coordination              [x] Balance      [] Vision/perception   []Sensation     []Gross Motor Coordination  [] ROM  [] Delirium                   [] Motor Control     OT PLAN OF CARE   OT POC based on physician orders, patient diagnosis and results of clinical assessment    Frequency/Duration: 1-3 days/wk for 2 weeks PRN   Specific OT Treatment Interventions to include:   * Instruction/training on adapted ADL techniques and AE recommendations to increase functional independence within precautions       * Training on energy conservation strategies, correct breathing pattern and techniques to improve independence/tolerance

## 2023-10-15 NOTE — PROGRESS NOTES
She is claustrphobic can she please have something for for MRI ? Please and thank you.  Message sent to Dr. Mikey Paula

## 2023-10-15 NOTE — PROGRESS NOTES
4 Eyes Skin Assessment     NAME:  Susan Gambino  YOB: 1934  MEDICAL RECORD NUMBER:  [id number]    The patient is being assessed for  Admission    I agree that at least one RN has performed a thorough Head to Toe Skin Assessment on the patient. ALL assessment sites listed below have been assessed. Areas assessed by both nurses:    Head, Face, Ears, Shoulders, Back, Chest, Arms, Elbows, Hands, Sacrum. Buttock, Coccyx, Ischium, and Legs. Feet and Heels        Does the Patient have a Wound?  No noted wound(s)       Michael Prevention initiated by RN: Yes  Wound Care Orders initiated by RN: No    Pressure Injury (Stage 3,4, Unstageable, DTI, NWPT, and Complex wounds) if present, place Wound referral order by RN under : No    New Ostomies, if present place, Ostomy referral order under : No     Nurse 1 eSignature: Electronically signed by Brigitte Gutierrez RN on 10/15/23 at 6:37 PM EDT    **SHARE this note so that the co-signing nurse can place an eSignature**    Nurse 2 eSignature: Electronically signed by Tara Blackburn RN on 10/15/23 at 6:46 PM EDT

## 2023-10-16 ENCOUNTER — APPOINTMENT (OUTPATIENT)
Dept: MRI IMAGING | Age: 88
End: 2023-10-16
Payer: MEDICARE

## 2023-10-16 PROCEDURE — G0378 HOSPITAL OBSERVATION PER HR: HCPCS

## 2023-10-16 PROCEDURE — 6370000000 HC RX 637 (ALT 250 FOR IP): Performed by: FAMILY MEDICINE

## 2023-10-16 PROCEDURE — A9579 GAD-BASE MR CONTRAST NOS,1ML: HCPCS | Performed by: RADIOLOGY

## 2023-10-16 PROCEDURE — 6370000000 HC RX 637 (ALT 250 FOR IP): Performed by: NURSE PRACTITIONER

## 2023-10-16 PROCEDURE — 2580000003 HC RX 258: Performed by: NURSE PRACTITIONER

## 2023-10-16 PROCEDURE — 6370000000 HC RX 637 (ALT 250 FOR IP): Performed by: INTERNAL MEDICINE

## 2023-10-16 PROCEDURE — 70553 MRI BRAIN STEM W/O & W/DYE: CPT

## 2023-10-16 PROCEDURE — 6360000004 HC RX CONTRAST MEDICATION: Performed by: RADIOLOGY

## 2023-10-16 PROCEDURE — 2580000003 HC RX 258: Performed by: FAMILY MEDICINE

## 2023-10-16 PROCEDURE — 96366 THER/PROPH/DIAG IV INF ADDON: CPT

## 2023-10-16 PROCEDURE — 6360000002 HC RX W HCPCS: Performed by: NURSE PRACTITIONER

## 2023-10-16 PROCEDURE — 96375 TX/PRO/DX INJ NEW DRUG ADDON: CPT

## 2023-10-16 PROCEDURE — 96372 THER/PROPH/DIAG INJ SC/IM: CPT

## 2023-10-16 RX ORDER — SODIUM CHLORIDE 9 MG/ML
INJECTION, SOLUTION INTRAVENOUS CONTINUOUS
Status: DISCONTINUED | OUTPATIENT
Start: 2023-10-16 | End: 2023-10-16

## 2023-10-16 RX ORDER — 0.9 % SODIUM CHLORIDE 0.9 %
500 INTRAVENOUS SOLUTION INTRAVENOUS ONCE
Status: DISCONTINUED | OUTPATIENT
Start: 2023-10-16 | End: 2023-10-16

## 2023-10-16 RX ORDER — 0.9 % SODIUM CHLORIDE 0.9 %
1000 INTRAVENOUS SOLUTION INTRAVENOUS ONCE
Status: DISCONTINUED | OUTPATIENT
Start: 2023-10-16 | End: 2023-10-17 | Stop reason: HOSPADM

## 2023-10-16 RX ORDER — SODIUM CHLORIDE 9 MG/ML
INJECTION, SOLUTION INTRAVENOUS CONTINUOUS
Status: DISCONTINUED | OUTPATIENT
Start: 2023-10-16 | End: 2023-10-17 | Stop reason: HOSPADM

## 2023-10-16 RX ADMIN — PETROLATUM: 420 OINTMENT TOPICAL at 20:16

## 2023-10-16 RX ADMIN — METOPROLOL TARTRATE 25 MG: 25 TABLET, FILM COATED ORAL at 20:15

## 2023-10-16 RX ADMIN — LORAZEPAM 1 MG: 2 INJECTION INTRAMUSCULAR; INTRAVENOUS at 08:16

## 2023-10-16 RX ADMIN — SODIUM CHLORIDE, PRESERVATIVE FREE 10 ML: 5 INJECTION INTRAVENOUS at 08:34

## 2023-10-16 RX ADMIN — OXCARBAZEPINE 600 MG: 300 TABLET, FILM COATED ORAL at 16:46

## 2023-10-16 RX ADMIN — ENOXAPARIN SODIUM 40 MG: 100 INJECTION SUBCUTANEOUS at 08:16

## 2023-10-16 RX ADMIN — MICONAZOLE NITRATE: 20.6 POWDER TOPICAL at 20:16

## 2023-10-16 RX ADMIN — METOPROLOL TARTRATE 25 MG: 25 TABLET, FILM COATED ORAL at 08:16

## 2023-10-16 RX ADMIN — SODIUM CHLORIDE 125 MG: 9 INJECTION, SOLUTION INTRAVENOUS at 12:28

## 2023-10-16 RX ADMIN — LEVOTHYROXINE SODIUM 125 MCG: 0.03 TABLET ORAL at 06:01

## 2023-10-16 RX ADMIN — AMLODIPINE BESYLATE 5 MG: 5 TABLET ORAL at 08:16

## 2023-10-16 RX ADMIN — SODIUM CHLORIDE: 9 INJECTION, SOLUTION INTRAVENOUS at 12:31

## 2023-10-16 RX ADMIN — PETROLATUM: 420 OINTMENT TOPICAL at 12:00

## 2023-10-16 RX ADMIN — GADOTERIDOL 18 ML: 279.3 INJECTION, SOLUTION INTRAVENOUS at 10:06

## 2023-10-16 RX ADMIN — OXCARBAZEPINE 600 MG: 300 TABLET, FILM COATED ORAL at 12:00

## 2023-10-16 RX ADMIN — MICONAZOLE NITRATE: 20.6 POWDER TOPICAL at 08:11

## 2023-10-16 RX ADMIN — OXCARBAZEPINE 600 MG: 300 TABLET, FILM COATED ORAL at 06:01

## 2023-10-16 ASSESSMENT — PAIN SCALES - GENERAL: PAINLEVEL_OUTOF10: 1

## 2023-10-16 NOTE — CARE COORDINATION
10/16/23 Transition of Care: Patient currently in MRI. Spoke with Kim/Katherin, Mary Lou and homa. Patient resides at home in a 3 story home with her 64yr old severely autistic/blind daughter. Patient provides 100% of the care for the daughter. Patient has a wheeled walker, life alert, and cane at home for which she refuses to use. There are no services in the home for the daughter. POA states patient and daughter have no support locally due to the family all living in or near [address] [place] [address]. The family has been working with much resistance to get patient/daughter closer to them. The patient does not have a pcp. She has not established with anyone since her physician retired. POA is also concerned about this. The patient does not drive any longer, she is 'holding on to furniture\" to ambulate due to refusing the wheeled walker at home. The POA's are requesting we speak with patient in regards to a discharge to [facility] where the patients daughter is currently. PT is pending. OT is 16/24 with patient having safety issues d/t poor insight into her deficits. Will speak with patient when she returns to the room and discuss discharge plan.  Electronically signed by Lo Long RN CM on 10/16/2023 at 10:37 AM

## 2023-10-16 NOTE — PROGRESS NOTES
[facility] PCP office to set up a new patient appointment .      Scheduled for  October 23rd, 2023 @ 10 AM with Brianna Borden PA-C.

## 2023-10-16 NOTE — FLOWSHEET NOTE
10/16/23 1200   Vital Signs   Orthostatic B/P and Pulse?  Yes   Blood Pressure Lying 131/54   Pulse Lying 72 PER MINUTE   Blood Pressure Sitting 138/51   Pulse Sitting 62 PER MINUTE   Blood Pressure Standing 101/84   Pulse Standing 64 PER MINUTE

## 2023-10-17 VITALS
OXYGEN SATURATION: 91 % | DIASTOLIC BLOOD PRESSURE: 58 MMHG | HEART RATE: 62 BPM | TEMPERATURE: 97.7 F | SYSTOLIC BLOOD PRESSURE: 140 MMHG | RESPIRATION RATE: 16 BRPM

## 2023-10-17 LAB
MICROORGANISM SPEC CULT: ABNORMAL
MICROORGANISM SPEC CULT: ABNORMAL
SPECIMEN DESCRIPTION: ABNORMAL

## 2023-10-17 PROCEDURE — 96372 THER/PROPH/DIAG INJ SC/IM: CPT

## 2023-10-17 PROCEDURE — 6360000002 HC RX W HCPCS: Performed by: NURSE PRACTITIONER

## 2023-10-17 PROCEDURE — 2580000003 HC RX 258: Performed by: FAMILY MEDICINE

## 2023-10-17 PROCEDURE — 97530 THERAPEUTIC ACTIVITIES: CPT

## 2023-10-17 PROCEDURE — 6370000000 HC RX 637 (ALT 250 FOR IP): Performed by: NURSE PRACTITIONER

## 2023-10-17 PROCEDURE — 6370000000 HC RX 637 (ALT 250 FOR IP): Performed by: INTERNAL MEDICINE

## 2023-10-17 PROCEDURE — 6370000000 HC RX 637 (ALT 250 FOR IP): Performed by: FAMILY MEDICINE

## 2023-10-17 PROCEDURE — G0378 HOSPITAL OBSERVATION PER HR: HCPCS

## 2023-10-17 PROCEDURE — 97161 PT EVAL LOW COMPLEX 20 MIN: CPT

## 2023-10-17 PROCEDURE — 97535 SELF CARE MNGMENT TRAINING: CPT

## 2023-10-17 RX ADMIN — ENOXAPARIN SODIUM 40 MG: 100 INJECTION SUBCUTANEOUS at 09:11

## 2023-10-17 RX ADMIN — AMLODIPINE BESYLATE 5 MG: 5 TABLET ORAL at 09:12

## 2023-10-17 RX ADMIN — LEVOTHYROXINE SODIUM 125 MCG: 0.03 TABLET ORAL at 05:52

## 2023-10-17 RX ADMIN — FERROUS SULFATE TAB 325 MG (65 MG ELEMENTAL FE) 325 MG: 325 (65 FE) TAB at 12:26

## 2023-10-17 RX ADMIN — METOPROLOL TARTRATE 25 MG: 25 TABLET, FILM COATED ORAL at 09:11

## 2023-10-17 RX ADMIN — OXCARBAZEPINE 600 MG: 300 TABLET, FILM COATED ORAL at 00:58

## 2023-10-17 RX ADMIN — SODIUM CHLORIDE, PRESERVATIVE FREE 10 ML: 5 INJECTION INTRAVENOUS at 09:12

## 2023-10-17 RX ADMIN — OXCARBAZEPINE 600 MG: 300 TABLET, FILM COATED ORAL at 12:26

## 2023-10-17 RX ADMIN — OXCARBAZEPINE 600 MG: 300 TABLET, FILM COATED ORAL at 05:52

## 2023-10-17 RX ADMIN — MICONAZOLE NITRATE: 20.6 POWDER TOPICAL at 09:14

## 2023-10-17 NOTE — PROGRESS NOTES
Physical Therapy Initial Evaluation    Name: Simran Smith  : 6/10/1934  MRN: [id number]      Date of Service: 10/17/2023    Evaluating PT:  Stef Cueto, PT [id number]    Referring provider/PT Order:  PT Eval and Treat   10/14/23 2245  PT evaluation and treat      Jatinder Faria MD     Room #:  6953/9133-S  Diagnosis:  Lightheadedness [R42]  Near syncope [R55]  Anemia, unspecified type [D64.9]  PMHx/PSHx:     has a past medical history of Allergic rhinitis, Chronic back pain, Dizziness and giddiness, Hypertension, Hypothyroidism, Iron deficiency, LBBB (left bundle branch block), Neuropathy, Seizures ([address]), and Vitamin B12 deficiency. has a past surgical history that includes Hysterectomy; Appendectomy; Cholecystectomy; hernia repair (); back surgery (MORE 10 YRS ); Breast surgery; Facial nerve surgery (); and Colonoscopy (2011). Procedure/Surgery:  none  Precautions:  Falls,  FWB (full weight bearing) ,   Equipment Needs: Wheeled Walker,    SUBJECTIVE:    Home Living: Pt lives with daughter (who is blind) in 2 story home with 3 steps and 1HR to neter. Bed on 2nd floor with full flight of steps and 1HR to access. Bathroom on 1st floor Patient ambulated independently holding environmental supports PTA. Equipment owned: Drew Morrow,      OBJECTIVE:   Initial Evaluation  Date: 10/17/23 Treatment Short Term/ Long Term   Goals   AM-PAC 6 Clicks 15/32     Was pt agreeable to Eval/treatment? yes     Does pt have pain? no     Bed Mobility  Rolling: Sup  Supine to sit:   Sup   Sit to supine: NT   Scooting: Sup   Rolling: Ind  Supine to sit: Ind  Sit to supine: Ind  Scooting: Ind   Transfers Sit to stand: Min to fww  Stand to sit: Min  Stand pivot: Min with fww cues to stay close to device. Sit to stand: Mod Ind  to fww  Stand to sit: Mod Ind    Stand pivot: Mod Ind  with fww   Ambulation    40 feet with fww Mincues for walker safety.    100 feet with Mod Ind     Stair negotiation: ascended and

## 2023-10-17 NOTE — CARE COORDINATION
10/17/23 Update CM Note: patient remains observation. She is working with therapy and walking 40ft with a wheeled walker. Yesterday orthos were positive and fluids given, despite fluids orthos remained positive but improved. She is not complaining this am. The fluids remain at 100hr. Discharge plan remains the same. She has been assigned a pcp also. [facility] given referral for walker as patient had not preference. She is unable to be set up with home care as she does not currently have a pcp. This was explained to the family. Will follow for readiness to discharge. Electronically signed by Brisa Harper RN CM on 10/17/2023 at 8:59 AM      The Plan for Transition of Care is related to the following treatment goals: dme    The Patient and/or patient representative was provided with a choice of provider and agrees   with the discharge plan. [x] Yes [] No    Freedom of choice list was provided with basic dialogue that supports the patient's individualized plan of care/goals, treatment preferences and shares the quality data associated with the providers.  [x] Yes [] No

## 2023-10-17 NOTE — PLAN OF CARE
Problem: Pain  Goal: Verbalizes/displays adequate comfort level or baseline comfort level  Outcome: Adequate for Discharge     Problem: Skin/Tissue Integrity  Goal: Absence of new skin breakdown  Description: 1. Monitor for areas of redness and/or skin breakdown  2. Assess vascular access sites hourly  3. Every 4-6 hours minimum:  Change oxygen saturation probe site  4. Every 4-6 hours:  If on nasal continuous positive airway pressure, respiratory therapy assess nares and determine need for appliance change or resting period.   Outcome: Adequate for Discharge     Problem: Safety - Adult  Goal: Free from fall injury  Outcome: Adequate for Discharge     Problem: ABCDS Injury Assessment  Goal: Absence of physical injury  Outcome: Adequate for Discharge

## 2023-10-17 NOTE — PROGRESS NOTES
Occupational Therapy  OT BEDSIDE TREATMENT NOTE    [address] [place] [address]        VQCJ:  Patient Name: Chantel Cheema  MRN: [id number]  : 6/10/1934  Room: [facility]-     Per OT Eval:    Evaluating OT: Av Chandler, [address] OTR/L; [id number]       Referring Provider: Richard Lemus MD    Specific Provider Orders/Date: OT Eval and Treat 10/14/23       Diagnosis: Near syncope   Lightheadedness    Surgery: none this admission     Pertinent Medical History:  has a past medical history of Allergic rhinitis, Chronic back pain, Dizziness and giddiness, Hypertension, Hypothyroidism, Iron deficiency, LBBB (left bundle branch block), Neuropathy, Seizures ([address]), and Vitamin B12 deficiency.        Reason for Admission: dizziness, weakness, nausea  CT of head = extra-axial calcified lesion along the petrous apex of the left temporal bone, most likely a meningioma     Recommended Adaptive Equipment:  TBD pending progression - shower chair      Precautions:  Fall Risk, Bed Alarm, Insight      Assessment of current deficits:    [x] Functional mobility            [x]ADLs           [x] Strength                  [x]Cognition    [x] Functional transfers          [x] IADLs         [x] Safety Awareness   [x]Endurance    [x] Fine Coordination                         [x] Balance      [] Vision/perception   []Sensation      []Gross Motor Coordination             [] ROM           [] Delirium                   [] Motor Control      OT PLAN OF CARE   OT POC based on physician orders, patient diagnosis and results of clinical assessment     Frequency/Duration: 1-3 days/wk for 2 weeks PRN   Specific OT Treatment Interventions to include:   * Instruction/training on adapted ADL techniques and AE recommendations to increase functional independence within precautions       * Training on energy conservation strategies, correct breathing pattern and

## 2023-10-17 NOTE — PROGRESS NOTES
Called her niece Sonali Shearer to confirm a time for  as she lives in [place].   Her estimated time of  is 6PM.

## 2023-10-20 ENCOUNTER — HOSPITAL ENCOUNTER (EMERGENCY)
Age: 88
Discharge: HOME | End: 2023-10-20
Payer: MEDICARE

## 2023-10-20 ENCOUNTER — APPOINTMENT (OUTPATIENT)
Dept: ULTRASOUND IMAGING | Age: 88
End: 2023-10-20
Payer: MEDICARE

## 2023-10-20 VITALS
WEIGHT: 190 LBS | BODY MASS INDEX: 34.75 KG/M2 | TEMPERATURE: 98.1 F | SYSTOLIC BLOOD PRESSURE: 138 MMHG | DIASTOLIC BLOOD PRESSURE: 65 MMHG | RESPIRATION RATE: 18 BRPM | HEART RATE: 64 BPM | OXYGEN SATURATION: 98 %

## 2023-10-20 DIAGNOSIS — I80.8 THROMBOPHLEBITIS OF CEPHALIC VEIN: Primary | ICD-10-CM

## 2023-10-20 PROCEDURE — 93971 EXTREMITY STUDY: CPT

## 2023-10-20 PROCEDURE — 99284 EMERGENCY DEPT VISIT MOD MDM: CPT

## 2023-10-20 ASSESSMENT — LIFESTYLE VARIABLES
HOW MANY STANDARD DRINKS CONTAINING ALCOHOL DO YOU HAVE ON A TYPICAL DAY: PATIENT DOES NOT DRINK
HOW OFTEN DO YOU HAVE A DRINK CONTAINING ALCOHOL: NEVER

## 2023-10-20 ASSESSMENT — PAIN - FUNCTIONAL ASSESSMENT: PAIN_FUNCTIONAL_ASSESSMENT: NONE - DENIES PAIN

## 2023-10-20 NOTE — DISCHARGE INSTRUCTIONS
TAKE 81MG ASPIRIN TWICE DAILY. WARM COMPRESSES FOR 20 MINUTES AT A TIME THROUGHOUT THE DAY TO RIGHT FOREARM  FOLLOW UP WITH A PCP NEXT WEEK FOR RE-EVALUATION. USE THE INFORMATION PROVIDED TO ESTABLISH CARE WITH A PCP IF NEEDED  RETURN FOR WORSENING SYMPTOMS.

## 2023-10-20 NOTE — ED PROVIDER NOTES
Independent COCO Visit. [address] [facility]   ED  Encounter Note  Admit Date/RoomTime: 10/20/2023  4:14 PM  ED Room: 36/36    NAME: Angeline Fung  : 6/10/1934  MRN: [id number]     Chief Complaint:  Arm Pain (Redness and swelling to R arm- admitted Saturday and had IV in this arm)    History of Present Illness       Angeline Fung is a 80 y.o. old female who presents to the emergency department by private vehicle, for traumatic Right forearm pain which occurred Wednesday morning when she woke up. She said Tuesday, she was discharged from here and had an IV in her right forearm. Wednesday morning, there was redness and swelling. She denies fevers, chills, nausea, vomiting, chest pain, SOB, dizziness, abdominal pain. ROS   Pertinent positives and negatives are stated within HPI, all other systems reviewed and are negative. Past Medical History:  has a past medical history of Allergic rhinitis, Chronic back pain, Dizziness and giddiness, Hypertension, Hypothyroidism, Iron deficiency, LBBB (left bundle branch block), Neuropathy, Seizures ([address]), and Vitamin B12 deficiency. Surgical History:  has a past surgical history that includes Hysterectomy; Appendectomy; Cholecystectomy; hernia repair (); back surgery (MORE 10 YRS ); Breast surgery; Facial nerve surgery (); and Colonoscopy (2011). Social History:  reports that she has never smoked. She has never used smokeless tobacco. She reports that she does not currently use alcohol. She reports that she does not use drugs. Family History: family history includes Arthritis in her father and mother; Cancer in her brother, father, and sister; Diabetes in her brother, mother, and sister; Heart Disease in her brother, mother, and sister.      Allergies: Aspirin-acetaminophen-caffeine and Pcn [penicillins]    Physical Exam   Oxygen Saturation Interpretation: Normal.        ED Triage Vitals

## 2024-04-26 ENCOUNTER — APPOINTMENT (OUTPATIENT)
Dept: GENERAL RADIOLOGY | Age: 89
End: 2024-04-26
Payer: MEDICARE

## 2024-04-26 ENCOUNTER — APPOINTMENT (OUTPATIENT)
Dept: CT IMAGING | Age: 89
End: 2024-04-26
Payer: MEDICARE

## 2024-04-26 ENCOUNTER — HOSPITAL ENCOUNTER (EMERGENCY)
Age: 89
Discharge: HOME | End: 2024-04-26
Attending: EMERGENCY MEDICINE
Payer: MEDICARE

## 2024-04-26 VITALS
WEIGHT: 180 LBS | BODY MASS INDEX: 32.92 KG/M2 | DIASTOLIC BLOOD PRESSURE: 65 MMHG | OXYGEN SATURATION: 93 % | TEMPERATURE: 97.6 F | HEART RATE: 66 BPM | SYSTOLIC BLOOD PRESSURE: 137 MMHG | RESPIRATION RATE: 20 BRPM

## 2024-04-26 DIAGNOSIS — R53.83 FATIGUE, UNSPECIFIED TYPE: Primary | ICD-10-CM

## 2024-04-26 DIAGNOSIS — R11.0 NAUSEA: ICD-10-CM

## 2024-04-26 DIAGNOSIS — R29.810 FACIAL DROOP: ICD-10-CM

## 2024-04-26 DIAGNOSIS — R90.89 ABNORMAL BRAIN CT: ICD-10-CM

## 2024-04-26 LAB
ALBUMIN SERPL-MCNC: 3.3 G/DL (ref 3.5–5.2)
ALP SERPL-CCNC: 88 U/L (ref 35–104)
ALT SERPL-CCNC: 9 U/L (ref 0–32)
ANION GAP SERPL CALCULATED.3IONS-SCNC: 12 MMOL/L (ref 7–16)
AST SERPL-CCNC: 26 U/L (ref 0–31)
BASOPHILS # BLD: 0.02 K/UL (ref 0–0.2)
BASOPHILS NFR BLD: 1 % (ref 0–2)
BILIRUB SERPL-MCNC: <0.2 MG/DL (ref 0–1.2)
BILIRUB UR QL STRIP: NEGATIVE
BUN SERPL-MCNC: 16 MG/DL (ref 6–23)
CALCIUM SERPL-MCNC: 8 MG/DL (ref 8.6–10.2)
CHLORIDE SERPL-SCNC: 109 MMOL/L (ref 98–107)
CLARITY UR: CLEAR
CO2 SERPL-SCNC: 21 MMOL/L (ref 22–29)
COLOR UR: YELLOW
CREAT SERPL-MCNC: 0.6 MG/DL (ref 0.5–1)
EOSINOPHIL # BLD: 0.07 K/UL (ref 0.05–0.5)
EOSINOPHILS RELATIVE PERCENT: 2 % (ref 0–6)
ERYTHROCYTE [DISTWIDTH] IN BLOOD BY AUTOMATED COUNT: 15 % (ref 11.5–15)
GFR SERPL CREATININE-BSD FRML MDRD: 88 ML/MIN/1.73M2
GLUCOSE SERPL-MCNC: 95 MG/DL (ref 74–99)
GLUCOSE UR STRIP-MCNC: NEGATIVE MG/DL
HCT VFR BLD AUTO: 30.9 % (ref 34–48)
HGB BLD-MCNC: 9.4 G/DL (ref 11.5–15.5)
HGB UR QL STRIP.AUTO: ABNORMAL
IMM GRANULOCYTES # BLD AUTO: <0.03 K/UL (ref 0–0.58)
IMM GRANULOCYTES NFR BLD: 1 % (ref 0–5)
INR PPP: 1
KETONES UR STRIP-MCNC: NEGATIVE MG/DL
LEUKOCYTE ESTERASE UR QL STRIP: NEGATIVE
LYMPHOCYTES NFR BLD: 1.14 K/UL (ref 1.5–4)
LYMPHOCYTES RELATIVE PERCENT: 30 % (ref 20–42)
MAGNESIUM SERPL-MCNC: 1.6 MG/DL (ref 1.6–2.6)
MCH RBC QN AUTO: 26.9 PG (ref 26–35)
MCHC RBC AUTO-ENTMCNC: 30.4 G/DL (ref 32–34.5)
MCV RBC AUTO: 88.3 FL (ref 80–99.9)
MONOCYTES NFR BLD: 0.32 K/UL (ref 0.1–0.95)
MONOCYTES NFR BLD: 8 % (ref 2–12)
NEUTROPHILS NFR BLD: 59 % (ref 43–80)
NEUTS SEG NFR BLD: 2.28 K/UL (ref 1.8–7.3)
NITRITE UR QL STRIP: NEGATIVE
PH UR STRIP: 7 [PH] (ref 5–9)
PLATELET # BLD AUTO: 190 K/UL (ref 130–450)
PMV BLD AUTO: 10.6 FL (ref 7–12)
POTASSIUM SERPL-SCNC: 3.9 MMOL/L (ref 3.5–5)
PROT SERPL-MCNC: 6.1 G/DL (ref 6.4–8.3)
PROT UR STRIP-MCNC: NEGATIVE MG/DL
PROTHROMBIN TIME: 11.1 SEC (ref 9.3–12.4)
RBC # BLD AUTO: 3.5 M/UL (ref 3.5–5.5)
RBC #/AREA URNS HPF: ABNORMAL /HPF
SODIUM SERPL-SCNC: 142 MMOL/L (ref 132–146)
SP GR UR STRIP: 1.01 (ref 1–1.03)
T4 FREE SERPL-MCNC: 1.1 NG/DL (ref 0.9–1.7)
TROPONIN I SERPL HS-MCNC: 26 NG/L (ref 0–9)
TROPONIN I SERPL HS-MCNC: 28 NG/L (ref 0–9)
TSH SERPL DL<=0.05 MIU/L-ACNC: 0.13 UIU/ML (ref 0.27–4.2)
UROBILINOGEN UR STRIP-ACNC: 0.2 EU/DL (ref 0–1)
WBC #/AREA URNS HPF: ABNORMAL /HPF
WBC OTHER # BLD: 3.9 K/UL (ref 4.5–11.5)

## 2024-04-26 PROCEDURE — 70498 CT ANGIOGRAPHY NECK: CPT

## 2024-04-26 PROCEDURE — 84439 ASSAY OF FREE THYROXINE: CPT

## 2024-04-26 PROCEDURE — 71045 X-RAY EXAM CHEST 1 VIEW: CPT

## 2024-04-26 PROCEDURE — 81001 URINALYSIS AUTO W/SCOPE: CPT

## 2024-04-26 PROCEDURE — 80053 COMPREHEN METABOLIC PANEL: CPT

## 2024-04-26 PROCEDURE — 0042T CT BRAIN PERFUSION: CPT

## 2024-04-26 PROCEDURE — 99285 EMERGENCY DEPT VISIT HI MDM: CPT

## 2024-04-26 PROCEDURE — 85610 PROTHROMBIN TIME: CPT

## 2024-04-26 PROCEDURE — 85025 COMPLETE CBC W/AUTO DIFF WBC: CPT

## 2024-04-26 PROCEDURE — 70450 CT HEAD/BRAIN W/O DYE: CPT

## 2024-04-26 PROCEDURE — 84443 ASSAY THYROID STIM HORMONE: CPT

## 2024-04-26 PROCEDURE — 96374 THER/PROPH/DIAG INJ IV PUSH: CPT

## 2024-04-26 PROCEDURE — 70496 CT ANGIOGRAPHY HEAD: CPT

## 2024-04-26 PROCEDURE — 83735 ASSAY OF MAGNESIUM: CPT

## 2024-04-26 PROCEDURE — 84484 ASSAY OF TROPONIN QUANT: CPT

## 2024-04-26 PROCEDURE — 6360000004 HC RX CONTRAST MEDICATION: Performed by: RADIOLOGY

## 2024-04-26 PROCEDURE — 2580000003 HC RX 258

## 2024-04-26 RX ORDER — 0.9 % SODIUM CHLORIDE 0.9 %
1000 INTRAVENOUS SOLUTION INTRAVENOUS ONCE
Status: COMPLETED | OUTPATIENT
Start: 2024-04-26 | End: 2024-04-26

## 2024-04-26 RX ORDER — ONDANSETRON 4 MG/1
4 TABLET, FILM COATED ORAL EVERY 8 HOURS PRN
Qty: 12 TABLET | Refills: 0 | Status: SHIPPED | OUTPATIENT
Start: 2024-04-26 | End: 2024-05-01

## 2024-04-26 RX ADMIN — SODIUM CHLORIDE 1000 ML: 9 INJECTION, SOLUTION INTRAVENOUS at 16:17

## 2024-04-26 RX ADMIN — IOPAMIDOL 100 ML: 755 INJECTION, SOLUTION INTRAVENOUS at 13:39

## 2024-04-26 NOTE — DISCHARGE INSTRUCTIONS
XR CHEST PORTABLE   Final Result   No acute process.         CT HEAD WO CONTRAST   Final Result   Addendum (preliminary) 1 of 1   ADDENDUM:   The results of this report were called to the emergency department by   radiology personnel at 1:56 p.m..         Final   1.  There is no acute intracranial abnormality.  Specifically, there is no   intracranial hemorrhage.   2. Atrophy and periventricular microvascular ischemic disease   3. Chronic extra-axial calcified lesion seen within the left cerebral pontine   angle measuring proximally 2.3 by 0.8 cm.  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            CTA NECK W CONTRAST   Final Result   1. No perfusion mismatch.   2. Unremarkable CTA of the head.   3. Unremarkable CTA of the neck.         CTA HEAD W CONTRAST   Final Result   1. No perfusion mismatch.   2. Unremarkable CTA of the head.   3. Unremarkable CTA of the neck.         CT BRAIN PERFUSION   Final Result   1. No perfusion mismatch.   2. Unremarkable CTA of the head.   3. Unremarkable CTA of the neck.

## 2024-04-26 NOTE — ED PROVIDER NOTES
strict return precautions.  She is understanding and agreeable to plan.  Close discharge home with Zofran.  Does follow with neurology at [facility].    Amount and/or Complexity of Data Reviewed  Labs: ordered.  Radiology: ordered and independent interpretation performed.  ECG/medicine tests: ordered and independent interpretation performed.    Risk  Prescription drug management.        History From: patient    Social Determinants of Health : None    Chronic Conditions affecting care: Zeyad is an 89 y.o. female who   has a past medical history of Allergic rhinitis, Chronic back pain, Dizziness and giddiness (02/25/2019), Hypertension, Hypothyroidism, Iron deficiency (02/25/2019), LBBB (left bundle branch block), Neuropathy, Seizures (HCC), and Vitamin B12 deficiency (02/25/2019).     Records Reviewed (Source) neurology note reviewed - Dr. Aguilar 7/26/23    CONSULTS: (Who and What was discussed)  None     See ED COURSE    Disposition Considerations (Tests not ordered but considered, Shared Decision Making, Pt Expectation of Test or Tx.): Spoke with pt regarding plans for testing and tx; she is understanding and agreeable to plan.     Diagnoses considered include (but not limited to): ICH vs CVA vs electrolyte abnormality vs ACS vs PNA, etc.     See ED COURSE for additional MDM. Labs & imaging reviewed and interpreted, see RESULTS above.     Re-Evaluations:           See ED Course above.       This patient's ED course included: a personal history and physicial examination, multiple bedside re-evaluations, and IV medications    This patient has remained hemodynamically stable during their ED course.      Consultations:  See ED Course    Counseling:   The emergency physician has spoken with the patient and discussed today’s results, in addition to providing specific details for the plan of care and counseling regarding the diagnosis and prognosis.  Questions are answered at this time and they are agreeable with  current cardiac rate, rhythm, and QT interval. POCT glucose ordered to rule out acute hyperglycemia or hypoglycemia. CBC was ordered as part of my assessment for possible infection, anemia or thrombocytopenia Hepatic function panel to evaluate for liver/biliary disease. Urinalysis to evaluate for a UTI. Troponin as a marker for myocardial ischemia or heart strain. Chest x-ray for any possible signs of, but without limitation to, pneumonia, pleural effusions, cardiomegaly, pneumothorax, atelectasis, rib or sternal abnormalities including fractures. CT head without contrast to evaluate for hemorrhage or masses. CTA head and neck to evaluate for vascular occlusion, dissection or aneurysms. CT brain perfusion to evaluate for large vessel occlusion.      My findings/plan: The primary encounter diagnosis was Fatigue, unspecified type. Diagnoses of Nausea, Facial droop -due to trigeminal neuralgia -chronic, and Abnormal brain CT were also pertinent to this visit.  Discharge Medication List as of 4/26/2024  8:23 PM        START taking these medications    Details   ondansetron (ZOFRAN) 4 MG tablet Take 1 tablet by mouth every 8 hours as needed for Nausea or Vomiting May substitute for covered product, Disp-12 tablet, R-0Print           DO Ally Stringer Matthew D, DO  04/28/24 2051

## 2024-04-30 LAB
EKG ATRIAL RATE: 60 BPM
EKG P AXIS: 49 DEGREES
EKG P-R INTERVAL: 202 MS
EKG Q-T INTERVAL: 466 MS
EKG QRS DURATION: 148 MS
EKG QTC CALCULATION (BAZETT): 466 MS
EKG R AXIS: -14 DEGREES
EKG T AXIS: 121 DEGREES
EKG VENTRICULAR RATE: 60 BPM

## 2024-05-08 ENCOUNTER — APPOINTMENT (OUTPATIENT)
Dept: GENERAL RADIOLOGY | Age: 89
End: 2024-05-08
Payer: MEDICARE

## 2024-05-08 ENCOUNTER — HOSPITAL ENCOUNTER (EMERGENCY)
Age: 89
Discharge: HOME | End: 2024-05-08
Attending: EMERGENCY MEDICINE
Payer: MEDICARE

## 2024-05-08 ENCOUNTER — APPOINTMENT (OUTPATIENT)
Dept: CT IMAGING | Age: 89
End: 2024-05-08
Payer: MEDICARE

## 2024-05-08 VITALS
OXYGEN SATURATION: 97 % | TEMPERATURE: 99 F | HEART RATE: 56 BPM | RESPIRATION RATE: 15 BRPM | SYSTOLIC BLOOD PRESSURE: 164 MMHG | DIASTOLIC BLOOD PRESSURE: 82 MMHG

## 2024-05-08 DIAGNOSIS — R42 VERTIGO, INTERMITTENT: Primary | ICD-10-CM

## 2024-05-08 LAB
ALBUMIN SERPL-MCNC: 4.1 G/DL (ref 3.5–5.2)
ALP SERPL-CCNC: 101 U/L (ref 35–104)
ALT SERPL-CCNC: 12 U/L (ref 0–32)
ANION GAP SERPL CALCULATED.3IONS-SCNC: 12 MMOL/L (ref 7–16)
AST SERPL-CCNC: 19 U/L (ref 0–31)
BASOPHILS # BLD: 0.01 K/UL (ref 0–0.2)
BASOPHILS NFR BLD: 0 % (ref 0–2)
BILIRUB SERPL-MCNC: 0.3 MG/DL (ref 0–1.2)
BNP SERPL-MCNC: 704 PG/ML (ref 0–450)
BUN SERPL-MCNC: 14 MG/DL (ref 6–23)
CALCIUM SERPL-MCNC: 9.3 MG/DL (ref 8.6–10.2)
CHLORIDE SERPL-SCNC: 101 MMOL/L (ref 98–107)
CO2 SERPL-SCNC: 24 MMOL/L (ref 22–29)
CREAT SERPL-MCNC: 0.7 MG/DL (ref 0.5–1)
EKG P AXIS: 46 DEGREES
EKG P-R INTERVAL: 194 MS
EKG Q-T INTERVAL: 480 MS
EKG QRS DURATION: 142 MS
EKG QTC CALCULATION (BAZETT): 467 MS
EKG R AXIS: -26 DEGREES
EKG T AXIS: 89 DEGREES
EKG VENTRICULAR RATE: 57 BPM
EOSINOPHIL # BLD: 0.07 K/UL (ref 0.05–0.5)
EOSINOPHILS RELATIVE PERCENT: 2 % (ref 0–6)
ERYTHROCYTE [DISTWIDTH] IN BLOOD BY AUTOMATED COUNT: 15.3 % (ref 11.5–15)
FLUAV RNA RESP QL NAA+PROBE: NOT DETECTED
FLUBV RNA RESP QL NAA+PROBE: NOT DETECTED
GFR, ESTIMATED: 79 ML/MIN/1.73M2
GLUCOSE SERPL-MCNC: 101 MG/DL (ref 74–99)
HCT VFR BLD AUTO: 34.5 % (ref 34–48)
HGB BLD-MCNC: 11.1 G/DL (ref 11.5–15.5)
IMM GRANULOCYTES # BLD AUTO: <0.03 K/UL (ref 0–0.58)
IMM GRANULOCYTES NFR BLD: 0 % (ref 0–5)
LYMPHOCYTES NFR BLD: 1.21 K/UL (ref 1.5–4)
LYMPHOCYTES RELATIVE PERCENT: 25 % (ref 20–42)
MCH RBC QN AUTO: 27.5 PG (ref 26–35)
MCHC RBC AUTO-ENTMCNC: 32.2 G/DL (ref 32–34.5)
MCV RBC AUTO: 85.6 FL (ref 80–99.9)
MONOCYTES NFR BLD: 0.42 K/UL (ref 0.1–0.95)
MONOCYTES NFR BLD: 9 % (ref 2–12)
NEUTROPHILS NFR BLD: 64 % (ref 43–80)
NEUTS SEG NFR BLD: 3.03 K/UL (ref 1.8–7.3)
PLATELET # BLD AUTO: 230 K/UL (ref 130–450)
PMV BLD AUTO: 10.8 FL (ref 7–12)
POTASSIUM SERPL-SCNC: 4.3 MMOL/L (ref 3.5–5)
PROT SERPL-MCNC: 7 G/DL (ref 6.4–8.3)
RBC # BLD AUTO: 4.03 M/UL (ref 3.5–5.5)
SARS-COV-2 RNA RESP QL NAA+PROBE: NOT DETECTED
SODIUM SERPL-SCNC: 137 MMOL/L (ref 132–146)
SOURCE: NORMAL
SPECIMEN DESCRIPTION: NORMAL
TROPONIN I SERPL HS-MCNC: 30 NG/L (ref 0–9)
TROPONIN I SERPL HS-MCNC: 34 NG/L (ref 0–9)
WBC OTHER # BLD: 4.8 K/UL (ref 4.5–11.5)

## 2024-05-08 PROCEDURE — 99285 EMERGENCY DEPT VISIT HI MDM: CPT

## 2024-05-08 PROCEDURE — 96374 THER/PROPH/DIAG INJ IV PUSH: CPT

## 2024-05-08 PROCEDURE — 93005 ELECTROCARDIOGRAM TRACING: CPT | Performed by: STUDENT IN AN ORGANIZED HEALTH CARE EDUCATION/TRAINING PROGRAM

## 2024-05-08 PROCEDURE — 83880 ASSAY OF NATRIURETIC PEPTIDE: CPT

## 2024-05-08 PROCEDURE — 87636 SARSCOV2 & INF A&B AMP PRB: CPT

## 2024-05-08 PROCEDURE — 70450 CT HEAD/BRAIN W/O DYE: CPT

## 2024-05-08 PROCEDURE — 6360000002 HC RX W HCPCS: Performed by: EMERGENCY MEDICINE

## 2024-05-08 PROCEDURE — 80053 COMPREHEN METABOLIC PANEL: CPT

## 2024-05-08 PROCEDURE — 85025 COMPLETE CBC W/AUTO DIFF WBC: CPT

## 2024-05-08 PROCEDURE — 6370000000 HC RX 637 (ALT 250 FOR IP): Performed by: EMERGENCY MEDICINE

## 2024-05-08 PROCEDURE — 71045 X-RAY EXAM CHEST 1 VIEW: CPT

## 2024-05-08 PROCEDURE — 84484 ASSAY OF TROPONIN QUANT: CPT

## 2024-05-08 PROCEDURE — 6370000000 HC RX 637 (ALT 250 FOR IP): Performed by: STUDENT IN AN ORGANIZED HEALTH CARE EDUCATION/TRAINING PROGRAM

## 2024-05-08 PROCEDURE — 93010 ELECTROCARDIOGRAM REPORT: CPT | Performed by: INTERNAL MEDICINE

## 2024-05-08 PROCEDURE — 2580000003 HC RX 258: Performed by: STUDENT IN AN ORGANIZED HEALTH CARE EDUCATION/TRAINING PROGRAM

## 2024-05-08 RX ORDER — PREDNISONE 20 MG/1
60 TABLET ORAL ONCE
Status: COMPLETED | OUTPATIENT
Start: 2024-05-08 | End: 2024-05-08

## 2024-05-08 RX ORDER — KETOROLAC TROMETHAMINE 30 MG/ML
15 INJECTION, SOLUTION INTRAMUSCULAR; INTRAVENOUS ONCE
Status: COMPLETED | OUTPATIENT
Start: 2024-05-08 | End: 2024-05-08

## 2024-05-08 RX ORDER — ACETAMINOPHEN 325 MG/1
650 TABLET ORAL ONCE
Status: COMPLETED | OUTPATIENT
Start: 2024-05-08 | End: 2024-05-08

## 2024-05-08 RX ORDER — 0.9 % SODIUM CHLORIDE 0.9 %
500 INTRAVENOUS SOLUTION INTRAVENOUS ONCE
Status: COMPLETED | OUTPATIENT
Start: 2024-05-08 | End: 2024-05-08

## 2024-05-08 RX ORDER — MECLIZINE HCL 12.5 MG/1
12.5 TABLET ORAL ONCE
Status: COMPLETED | OUTPATIENT
Start: 2024-05-08 | End: 2024-05-08

## 2024-05-08 RX ORDER — MECLIZINE HCL 12.5 MG/1
12.5 TABLET ORAL 2 TIMES DAILY PRN
Qty: 10 TABLET | Refills: 0 | Status: SHIPPED | OUTPATIENT
Start: 2024-05-08 | End: 2024-05-13

## 2024-05-08 RX ADMIN — SODIUM CHLORIDE 500 ML: 9 INJECTION, SOLUTION INTRAVENOUS at 10:36

## 2024-05-08 RX ADMIN — MECLIZINE 12.5 MG: 12.5 TABLET ORAL at 10:35

## 2024-05-08 RX ADMIN — ACETAMINOPHEN 650 MG: 325 TABLET ORAL at 11:45

## 2024-05-08 RX ADMIN — PREDNISONE 60 MG: 20 TABLET ORAL at 12:11

## 2024-05-08 RX ADMIN — KETOROLAC TROMETHAMINE 15 MG: 30 INJECTION, SOLUTION INTRAMUSCULAR at 12:11

## 2024-05-08 NOTE — DISCHARGE INSTRUCTIONS
Please follow-up with your primary care doctor.  Please return the hospital for any worsening symptoms

## 2024-05-08 NOTE — ED PROVIDER NOTES
Name: Zeyad Almazan    MRN: [id number]     Date / Time Roomed:  5/8/2024  9:28 AM  ED Bed Assignment:  17A/17A-17    ------------------ History of Present Illness --------------------  5/8/24, Time: 9:33 AM EDT   Chief Complaint   Patient presents with   • Facial Pain     Left sided x 1 week- hx trigeminal neuralgia   • Dizziness     X 1 week starting last saturday      HPI    Zeyad Almazan is a 89 y.o. female, with hx of chronic back pain, dizziness, hypertension, LBBB, trigeminal neuralgia, who presents to the ED today for this occurring intermittently over the past week.  Patient relates she came to the hospital about a week ago and had a workup for possible stroke evaluation however was discharged back home.  She relates she has had intermittent dizziness since being at home.  She denies any current dizziness however states when she gets up and moves around her dizziness this occurs.  She relates having history of left-sided facial droop and numbness which has been present since having surgery for her trigeminal neuralgia years ago.  She denies any weakness of the extremities.  She denies any chest pain or shortness of breath.  Denies any fever.  Denies any cough congestion.  Denies any abdominal pain.  The pt denies other ROS at this time.     PCP: No primary care provider on file..    -------------------- PMH --------------------    Past Medical History:   has a past medical history of Allergic rhinitis, Chronic back pain, Dizziness and giddiness (02/25/2019), Hypertension, Hypothyroidism, Iron deficiency (02/25/2019), LBBB (left bundle branch block), Neuropathy, Seizures ([facility]), and Vitamin B12 deficiency (02/25/2019).     Surgical History:  Past Surgical History:   Procedure Laterality Date   • APPENDECTOMY     • BACK SURGERY  MORE 10 YRS     LUMBAR   • BREAST SURGERY      MORE 10 YRS   • CHOLECYSTECTOMY     • COLONOSCOPY  11/1/2011   • FACIAL NERVE SURGERY  2008   • HERNIA REPAIR  2002/2003   • 
to peripheral vertigo as it has been intermittent and positional in nature. Pt has chronic left sided facial weakness and paresthesia which she states has been present for years since her trigeminal neuralgia surgery. Pt is overall well appearing with reassuring workup and feeling improved after treatment today. Pt lives with her daughter at home and does wish to go home today and f/u with her pcp. Prescription for short course of meclizine given and return precautions given. Pt agreeable with plan and d/c'd home.         -------------------- Consults --------------------  (Unless stated prior)  None    -------------------- RESULTS --------------------    Labs:  Results for orders placed or performed during the hospital encounter of 05/08/24   COVID-19 & Influenza Combo    Specimen: Nasopharyngeal Swab   Result Value Ref Range    Specimen Description .NASOPHARYNGEAL SWAB     Source NO SAMPLE RECEIVED     SARS-CoV-2 RNA, RT PCR Not Detected Not Detected    Influenza A Not Detected Not Detected    Influenza B Not Detected Not Detected   Troponin   Result Value Ref Range    Troponin, High Sensitivity 34 (H) 0 - 9 ng/L   CBC with Auto Differential   Result Value Ref Range    WBC 4.8 4.5 - 11.5 k/uL    RBC 4.03 3.50 - 5.50 m/uL    Hemoglobin 11.1 (L) 11.5 - 15.5 g/dL    Hematocrit 34.5 34.0 - 48.0 %    MCV 85.6 80.0 - 99.9 fL    MCH 27.5 26.0 - 35.0 pg    MCHC 32.2 32.0 - 34.5 g/dL    RDW 15.3 (H) 11.5 - 15.0 %    Platelets 230 130 - 450 k/uL    MPV 10.8 7.0 - 12.0 fL    Neutrophils % 64 43.0 - 80.0 %    Lymphocytes % 25 20.0 - 42.0 %    Monocytes % 9 2.0 - 12.0 %    Eosinophils % 2 0 - 6 %    Basophils % 0 0.0 - 2.0 %    Immature Granulocytes % 0 0.0 - 5.0 %    Neutrophils Absolute 3.03 1.80 - 7.30 k/uL    Lymphocytes Absolute 1.21 (L) 1.50 - 4.00 k/uL    Monocytes Absolute 0.42 0.10 - 0.95 k/uL    Eosinophils Absolute 0.07 0.05 - 0.50 k/uL    Basophils Absolute 0.01 0.00 - 0.20 k/uL    Immature Granulocytes Absolute

## 2025-01-18 NOTE — PROGRESS NOTES
Department of Internal Medicine  General Internal Medicine  Attending Progress Note      SUBJECTIVE:    Noted episodes of diarrhea today. Denied fever or chills. Expressed interest in being discharged. However she is aware that we need to test her stool for possible infection and then we can possibly determine when she is stable enough to be discharged. She did note that she has some social issues and her children are mostly dependent on her.     OBJECTIVE      Medications    Current Facility-Administered Medications: neomycin-polymyxin-hydrocortisone (CORTISPORIN) otic solution 3 drop, 3 drop, Both Ears, 4 times per day  pregabalin (LYRICA) capsule 25 mg, 25 mg, Oral, TID  acyclovir (ZOVIRAX) capsule 400 mg, 400 mg, Oral, TID  dextrose 5 % in lactated ringers infusion, , Intravenous, Continuous  gabapentin (NEURONTIN) capsule 100 mg, 100 mg, Oral, Nightly  [COMPLETED] remdesivir 200 mg in sodium chloride 0.9 % 250 mL IVPB, 200 mg, Intravenous, Once **FOLLOWED BY** remdesivir 100 mg in sodium chloride 0.9 % 250 mL IVPB, 100 mg, Intravenous, Q24H  0.9 % sodium chloride bolus, 30 mL, Intravenous, PRN  levothyroxine (SYNTHROID) tablet 137 mcg, 137 mcg, Oral, Daily  metoprolol tartrate (LOPRESSOR) tablet 25 mg, 25 mg, Oral, BID  OXcarbazepine (TRILEPTAL) tablet 300 mg, 300 mg, Oral, BID  DULoxetine (CYMBALTA) extended release capsule 30 mg, 30 mg, Oral, Daily  sodium chloride flush 0.9 % injection 10 mL, 10 mL, Intravenous, 2 times per day  sodium chloride flush 0.9 % injection 10 mL, 10 mL, Intravenous, PRN  enoxaparin (LOVENOX) injection 30 mg, 30 mg, Subcutaneous, BID  polyethylene glycol (GLYCOLAX) packet 17 g, 17 g, Oral, Daily PRN  acetaminophen (TYLENOL) tablet 650 mg, 650 mg, Oral, Q6H PRN **OR** acetaminophen (TYLENOL) suppository 650 mg, 650 mg, Rectal, Q6H PRN  famotidine (PEPCID) tablet 20 mg, 20 mg, Oral, BID  guaiFENesin-dextromethorphan (ROBITUSSIN DM) 100-10 MG/5ML syrup 5 mL, 5 mL, Oral, Q4H PRN  dexamethasone (DECADRON) tablet 6 mg, 6 mg, Oral, Daily  Vitamin D (CHOLECALCIFEROL) tablet 2,000 Units, 2,000 Units, Oral, Daily  zinc sulfate (ZINCATE) capsule 50 mg, 50 mg, Oral, Daily  ascorbic acid (VITAMIN C) tablet 1,000 mg, 1,000 mg, Oral, Daily  trimethobenzamide (TIGAN) injection 200 mg, 200 mg, Intramuscular, Q6H PRN  Physical    VITALS:  /61   Pulse 52   Temp 97.9 °F (36.6 °C) (Oral)   Resp 20   Ht 5' 1\" (1.549 m)   Wt 188 lb 3.2 oz (85.4 kg)   SpO2 94%   BMI 35.56 kg/m²   CONSTITUTIONAL:  awake, alert, cooperative, no apparent distress, and appears stated age  EYES:  Lids and lashes normal, pupils equal, round and reactive to light, extra ocular muscles intact, sclera clear, conjunctiva normal  ENT:  normocepalic, without obvious abnormality  NECK:  supple, symmetrical, trachea midline  HEMATOLOGIC/LYMPHATICS:  no cervical lymphadenopathy  LUNGS:  No increased work of breathing, good air exchange, clear to auscultation bilaterally, no crackles or wheezing  CARDIOVASCULAR:  normal apical pulses  ABDOMEN:  No scars, normal bowel sounds, soft, non-distended, non-tender, no masses palpated, no hepatosplenomegally  MUSCULOSKELETAL:  there is no redness, warmth, or swelling of the joints  NEUROLOGIC:  Awake, alert, oriented to name, place and time.     SKIN:  no bruising or bleeding  Data    CBC:   Lab Results   Component Value Date    WBC 4.0 01/05/2021    RBC 4.28 01/05/2021    HGB 12.5 01/05/2021    HCT 38.7 01/05/2021    MCV 90.4 01/05/2021    MCH 29.2 01/05/2021    MCHC 32.3 01/05/2021    RDW 13.4 01/05/2021     01/05/2021    MPV 10.3 01/05/2021     CMP:    Lab Results   Component Value Date     01/05/2021    K 3.9 01/05/2021     01/05/2021    CO2 26 01/05/2021    BUN 15 01/05/2021    CREATININE 0.8 01/05/2021    GFRAA >60 01/05/2021    LABGLOM >60 01/05/2021    GLUCOSE 89 01/05/2021    GLUCOSE 122 01/28/2012    PROT 6.5 01/05/2021    LABALBU 3.6 01/05/2021    LABALBU 4.4 01/28/2012    CALCIUM 9.0 01/05/2021    BILITOT 0.2 01/05/2021    ALKPHOS 94 01/05/2021    AST 28 01/05/2021    ALT 16 01/05/2021       ASSESSMENT AND PLAN      1. Acute respiratory failure due to COVID-19 [facility]):  Continue dexamethasone. Continue remdesivir. Continue breathing treatment. Overall looks improved however still having diarrhea. 2.  Hypertension essential:  Continue metoprolol. Blood pressure is well controlled. 3.  Hypothyroidism: On Synthroid. 4.  COVID-19    5. Depression/anxiety: Continue home medication. On Cymbalta. Addendum:  Diarrhea: Concern for C. difficile. However suspect that this is likely due to coronavirus. We will test patient stool for infection. If negative might start loperamide. yes